# Patient Record
Sex: FEMALE | Race: WHITE | NOT HISPANIC OR LATINO | Employment: UNEMPLOYED | URBAN - METROPOLITAN AREA
[De-identification: names, ages, dates, MRNs, and addresses within clinical notes are randomized per-mention and may not be internally consistent; named-entity substitution may affect disease eponyms.]

---

## 2017-04-27 ENCOUNTER — OFFICE VISIT (OUTPATIENT)
Dept: PSYCHIATRY | Facility: CLINIC | Age: 52
End: 2017-04-27

## 2017-04-27 DIAGNOSIS — F43.23 ADJUSTMENT DISORDER WITH MIXED ANXIETY AND DEPRESSED MOOD: Primary | ICD-10-CM

## 2017-04-27 PROCEDURE — 90791 PSYCH DIAGNOSTIC EVALUATION: CPT | Performed by: PSYCHOLOGIST
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
## 2017-05-24 ENCOUNTER — OFFICE VISIT (OUTPATIENT)
Dept: PSYCHIATRY | Facility: CLINIC | Age: 52
End: 2017-05-24

## 2017-05-24 DIAGNOSIS — F41.9 ANXIETY DISORDER, UNSPECIFIED TYPE: Primary | ICD-10-CM

## 2017-05-24 DIAGNOSIS — R45.4 ANGER: ICD-10-CM

## 2017-05-24 DIAGNOSIS — F32.89 OTHER DEPRESSION: ICD-10-CM

## 2017-05-24 PROCEDURE — 90834 PSYTX W PT 45 MINUTES: CPT | Performed by: PSYCHOLOGIST

## 2017-06-01 ENCOUNTER — OFFICE VISIT (OUTPATIENT)
Dept: PSYCHIATRY | Facility: CLINIC | Age: 52
End: 2017-06-01

## 2017-06-01 DIAGNOSIS — F43.23 ADJUSTMENT DISORDER WITH MIXED ANXIETY AND DEPRESSED MOOD: Primary | ICD-10-CM

## 2017-06-01 DIAGNOSIS — R45.4 ANGER: ICD-10-CM

## 2017-06-01 PROCEDURE — 90834 PSYTX W PT 45 MINUTES: CPT | Performed by: PSYCHOLOGIST

## 2017-06-01 NOTE — PROGRESS NOTES
Psych Progress Note    Nunu Falk is 51 y.o. female     1. Description of Session    The pt talked about arguments that she has with her mother. The pt was tearful as she talked about her mother putting her down and criticizing her. Role playing was conducted in session in order to help the pt stand up to her mother in a firm/respectful way. Also, the therapist helped the pt note what behaviors the pt does to start or fuel an argument. The pt was able to admit how she can be easily irritated or defensive. The therapist is teaching the pt language to use in resolving conflict with others, for now, with her mother. Morton setting was discussed as well in terms of keeping to rigid of boundaries with others instead of making amends and listening to the other person's side or expressing when the pt feels hurt in order to start a dialogue about making amends.    2. Assessment/Diagnostic Impression    The pt struggles with anxiety (she was shaky in session and has had panic attacks) and depressed (due to having a low opinion of herself/lack of belief in herself). Distress tends to be manifested in anger and the pt has impaired coping skills to resolve conflicts with others.    3. Treatment Plan    Follow up with assigned homework regarding talking to her mother more effectively and resolving conflicts in a healthier way. Help her process and heal from past traumas and help her build confidence in order to work again in order to feel less distressed and angry.

## 2017-06-10 NOTE — PROGRESS NOTES
"    Psych Progress Note    Nunu Falk is 51 y.o. female     1. Description of Session    The pt talked about feeling hurt and angry lately. She admits to having anger management problems when she was assessed by this therapist. The therapist helped the pt to talk about with whom she argues the most and gets hurt by the most. She was able to discuss her strained relationship with her mother (who adopted her as a baby) and how the pt feels put down and/or never good enough for her mother. The intervention used was helping the pt 'identify her role' in the difficult relationship: staying defensive, lashing back, and/or shutting down. Role playing was conducted in order to help the pt communicate more effectively with her mother and learn when to walk away and when to stand up for herself (in a respectful way). The pt is to talk to her mother and request that her mother no longer put her down or criticize her with statements such as, \"that is stupid\" but to also express to her mother that she will do the same. She is to express that if she feels criticized that the conversation would be over (but it is the pt's responsibility to inform her mother first why the conversation is ending).    2. Assessment/Diagnostic Impression    The pt struggles with anxiety and depression (due to having a low opinion of herself/lack of belief in herself). Distress tends to be manifested as anger and lashing out at others.     3. Treatment Plan    Help her process and heal from past traumas and help her build confidence in order to communicate more effectively with others in order to feel less distressed.  "

## 2017-06-20 ENCOUNTER — OFFICE VISIT (OUTPATIENT)
Dept: PSYCHIATRY | Facility: CLINIC | Age: 52
End: 2017-06-20

## 2017-06-20 DIAGNOSIS — R45.4 ANGER: ICD-10-CM

## 2017-06-20 DIAGNOSIS — F43.23 ADJUSTMENT DISORDER WITH MIXED ANXIETY AND DEPRESSED MOOD: Primary | ICD-10-CM

## 2017-06-20 PROCEDURE — 90834 PSYTX W PT 45 MINUTES: CPT | Performed by: PSYCHOLOGIST

## 2017-06-26 ENCOUNTER — OFFICE VISIT (OUTPATIENT)
Dept: PSYCHIATRY | Facility: CLINIC | Age: 52
End: 2017-06-26

## 2017-06-26 DIAGNOSIS — R45.4 ANGER: Primary | ICD-10-CM

## 2017-06-26 DIAGNOSIS — F41.9 ANXIETY AND DEPRESSION: ICD-10-CM

## 2017-06-26 DIAGNOSIS — F32.A ANXIETY AND DEPRESSION: ICD-10-CM

## 2017-06-26 PROCEDURE — 90834 PSYTX W PT 45 MINUTES: CPT | Performed by: PSYCHOLOGIST

## 2017-07-25 ENCOUNTER — OFFICE VISIT (OUTPATIENT)
Dept: PSYCHIATRY | Facility: CLINIC | Age: 52
End: 2017-07-25

## 2017-07-25 DIAGNOSIS — R45.4 ANGER: ICD-10-CM

## 2017-07-25 DIAGNOSIS — F41.9 ANXIETY AND DEPRESSION: Primary | ICD-10-CM

## 2017-07-25 DIAGNOSIS — F32.A ANXIETY AND DEPRESSION: Primary | ICD-10-CM

## 2017-07-25 PROCEDURE — 90834 PSYTX W PT 45 MINUTES: CPT | Performed by: PSYCHOLOGIST

## 2017-08-07 NOTE — PROGRESS NOTES
Psych Progress Note    Nunu Falk is 51 y.o. female was seen for her scheduled appointment at [facility] from 10:00 - 10:45am      1. Description of Session    The pt talked about feeling upset that she does not seem to get along with her daughter lately. The pt was tearful in session when she talked about how she was afraid that if she continues to argue with her daughter, that she will lose her and the connection she has with her grand-baby. The therapist helped her to flush out fears and evaluate them for validity. It was noted that just because they argue does not automatically mean that the relationship is over. The pt also talked about feeling guilty that her daughter does not know that her real dad is someone other than she thinks it is - the pt wonders if she should tell her/the daughter or not. Pros and cons were weighed and the therapist asked the pt if the pt would like to know if she were in her daughter's position. The pt expressed that she would like to tell her daughter and this was rehearsed in session through role play in order to help the pt gain insight into how to tell her.     2. Assessment/Diagnostic Impression    The pt struggles with anxiety (she can get shaky in session and has had panic attacks) and depressed (due to having a low opinion of herself/lack of belief in herself). Distress tends to be manifested in anger and the pt has impaired coping skills to resolve conflicts with others, though she is learning new skills to better control her anger.    3. Treatment Plan    Follow up with assigned homework regarding talking to her daughter more effectively and resolving conflicts in a healthier way. Help her process and heal from past traumas and help her build confidence in order to work again in order to feel less distressed and angry.

## 2017-08-14 ENCOUNTER — OFFICE VISIT (OUTPATIENT)
Dept: PSYCHIATRY | Facility: CLINIC | Age: 52
End: 2017-08-14

## 2017-08-14 DIAGNOSIS — R45.4 ANGER: ICD-10-CM

## 2017-08-14 DIAGNOSIS — F41.9 ANXIETY AND DEPRESSION: Primary | ICD-10-CM

## 2017-08-14 DIAGNOSIS — F32.A ANXIETY AND DEPRESSION: Primary | ICD-10-CM

## 2017-08-14 PROCEDURE — 90834 PSYTX W PT 45 MINUTES: CPT | Performed by: PSYCHOLOGIST
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
## 2017-08-21 ENCOUNTER — OFFICE VISIT (OUTPATIENT)
Dept: PSYCHIATRY | Facility: CLINIC | Age: 52
End: 2017-08-21

## 2017-08-21 DIAGNOSIS — R45.4 ANGER: ICD-10-CM

## 2017-08-21 DIAGNOSIS — F32.A ANXIETY AND DEPRESSION: Primary | ICD-10-CM

## 2017-08-21 DIAGNOSIS — F41.9 ANXIETY AND DEPRESSION: Primary | ICD-10-CM

## 2017-08-21 PROCEDURE — 90834 PSYTX W PT 45 MINUTES: CPT | Performed by: PSYCHOLOGIST

## 2017-09-11 ENCOUNTER — OFFICE VISIT (OUTPATIENT)
Dept: PSYCHIATRY | Facility: CLINIC | Age: 52
End: 2017-09-11

## 2017-09-11 DIAGNOSIS — F32.A ANXIETY AND DEPRESSION: Primary | ICD-10-CM

## 2017-09-11 DIAGNOSIS — F41.9 ANXIETY AND DEPRESSION: Primary | ICD-10-CM

## 2017-09-11 DIAGNOSIS — R45.4 ANGER: ICD-10-CM

## 2017-09-11 PROCEDURE — 90834 PSYTX W PT 45 MINUTES: CPT | Performed by: PSYCHOLOGIST

## 2017-09-11 NOTE — PROGRESS NOTES
Psych Progress Note    Nunu Falk is 51 y.o. female was seen for her scheduled appointment at [facility] from 10:00 - 10:45am.    1. Description of Session    The pt talked about arguing with her boyfriend, a neighbor, and her daughter. She expressed that she is easily irritated, holds things in, and then blows up. Issues of anger management were addressed, starting with processing the real emotions which are fear/worry and feeling hurt. Why she was hurt or fearful were discussed related to each argument. She then could see how she, in a way, picked a fight with her boyfriend when she was expressing in an unhealthy way that she does not trust him. This turned into a discussion about how the two of them need to be more open about money. Regarding another argument, after processing, the pt was able to realize that she fears that her daughter will cut her off and not talk to her. Role playing was conducted in order to help the pt make 'I statements,' and less 'you statements.' She is to practice expressing herself more often in terms of saying, 'I feel hurt that you ____' or 'I'm worried that ____' and then listen to the other person's side of the story before jumping to conclusions.     2. Assessment/Diagnostic Impression    The pt presents with anger, anxiety, and depression. She is learning better ways to manage her mood including improving her communication with others in order to feel less distressed.     3. Treatment Plan    Continue with anger management training and mood regulation training in order to diminish the pts distress.

## 2017-09-18 ENCOUNTER — OFFICE VISIT (OUTPATIENT)
Dept: PSYCHIATRY | Facility: CLINIC | Age: 52
End: 2017-09-18

## 2017-09-18 DIAGNOSIS — F32.A ANXIETY AND DEPRESSION: Primary | ICD-10-CM

## 2017-09-18 DIAGNOSIS — F41.9 ANXIETY AND DEPRESSION: Primary | ICD-10-CM

## 2017-09-18 DIAGNOSIS — R45.4 ANGER: ICD-10-CM

## 2017-09-18 PROCEDURE — 90834 PSYTX W PT 45 MINUTES: CPT | Performed by: PSYCHOLOGIST
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
## 2017-09-25 ENCOUNTER — OFFICE VISIT (OUTPATIENT)
Dept: PSYCHIATRY | Facility: CLINIC | Age: 52
End: 2017-09-25

## 2017-09-25 DIAGNOSIS — F32.A ANXIETY AND DEPRESSION: Primary | ICD-10-CM

## 2017-09-25 DIAGNOSIS — F41.9 ANXIETY AND DEPRESSION: Primary | ICD-10-CM

## 2017-09-25 DIAGNOSIS — R45.4 ANGER: ICD-10-CM

## 2017-09-25 PROCEDURE — 90834 PSYTX W PT 45 MINUTES: CPT | Performed by: PSYCHOLOGIST

## 2017-09-25 NOTE — PROGRESS NOTES
Psych Progress Note    Nunu Falk is 51 y.o. female was seen for her scheduled appointment at [facility] from 10:00 - 10:45am.    1. Description of Session    The pt talked about arguing with her ex-. They live together and have for some time now and he supports the two of them financially. She talked about feeling hurt that he may (or may not) be cheating on her. He expressed frustration with her for getting short with him at a restaurant a while back. The therapist helped the pt see how they both care about each other but they just do not know where they  the relationship (are they together or not?). The pt is to have this conversation with him in a positive and solution-focused way ('we obviously care about each other and love each other. Why not consider us together and exclusive?). Similar to previous sessions, the therapist helped the pt to see the pt's role in the arguments and helped the pt identify how she may need to apologize for getting short with him at the restaurant. Another part of the session was used to help the pt express understanding of where both her mother or ex are coming from (feeling hurt, having a difficult life, needing to be appreciated) in order to help the pt feel less angry/not take things so personally.    2. Assessment/Diagnostic Impression    The pt presents with anger, anxiety, and depression. She is learning better ways to manage her mood including improving her communication with others in order to feel less distressed.     3. Treatment Plan    Continue with anger management training and mood regulation training in order to diminish the pts distress.
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
## 2017-10-02 ENCOUNTER — OFFICE VISIT (OUTPATIENT)
Dept: PSYCHIATRY | Facility: CLINIC | Age: 52
End: 2017-10-02

## 2017-10-02 DIAGNOSIS — F32.A ANXIETY AND DEPRESSION: Primary | ICD-10-CM

## 2017-10-02 DIAGNOSIS — F41.9 ANXIETY AND DEPRESSION: Primary | ICD-10-CM

## 2017-10-02 DIAGNOSIS — R45.4 ANGER: ICD-10-CM

## 2017-10-02 PROCEDURE — 90834 PSYTX W PT 45 MINUTES: CPT | Performed by: PSYCHOLOGIST

## 2017-10-02 NOTE — PROGRESS NOTES
Psych Progress Note    Nunu Falk is 51 y.o. female was seen for her scheduled appointment at [facility] from 9:00 - 9:45am.    1. Description of Session    The pt talked about feeling angry that she could not rent a house she and her ex- (S) wanted to rent because they have dogs. The pt expressed that she got her dogs registered as therapy dogs and said how this is not fair. Similar to previous sessions, the therapist helped her to work on her anger. The therapist helped her understand how landlords, such as private landlords can decided to allow pets or not; it is their right. The pt was able to make sense of this after a while. Seeing from the other person's perspective was an intervention used today in order to help her calm and be less angry. For example, the therapist helped her to see her pattern where the pt lashes out, then the other person gets defensive/lashes out, and that is how a lot of her anger is fueled (and no one wins). The pt is to practice seeing issues from other people's perspective.    2. Assessment/Diagnostic Impression    The pt presents with anger, anxiety, and depression. She is learning better ways to manage her mood including improving her communication with others in order to feel less distressed.     3. Treatment Plan    Continue with anger management training and mood regulation training in order to diminish the pts distress.

## 2017-10-23 ENCOUNTER — OFFICE VISIT (OUTPATIENT)
Dept: PSYCHIATRY | Facility: CLINIC | Age: 52
End: 2017-10-23

## 2017-10-23 DIAGNOSIS — F32.A ANXIETY AND DEPRESSION: Primary | ICD-10-CM

## 2017-10-23 DIAGNOSIS — R45.4 ANGER: ICD-10-CM

## 2017-10-23 DIAGNOSIS — F41.9 ANXIETY AND DEPRESSION: Primary | ICD-10-CM

## 2017-10-23 PROCEDURE — 90834 PSYTX W PT 45 MINUTES: CPT | Performed by: PSYCHOLOGIST

## 2017-10-23 NOTE — PROGRESS NOTES
PROGRESS NOTE  Data:  Nunu Falk is a 51 y.o. female who met with the undersigned for a regularly scheduled individual outpatient psychotherapy session from 10:00 - 10:45am.      Clinical Maneuvering/Intervention:      Pt talked about struggling with anger and irritability. She argued with a counselor assessing her in order to get disability. She argued with her ex- and a nurse at an appointment as well. She also is worried about her son's safety at school but is hesitant to talk to him about it. Perspective taking was conducted, helping the pt practice seeing things from the other person's perspective. The therapist helped the pt practice this over and over with each person she mentioned - that the nurse is just doing her job, that the counselor is just doing her job, etc. The pt was able to do this exercise and seemed to relax physically. She talked about a neighbor who seems negative and miserable. The therapist helped her see that if she continues to have outbursts without seeing situations from others' perspective, that she could end up being like this neighbor. Instead the pt is to continue taking care of herself, her own responsibilities, and to make smart decisions to not engage in negativity.     Assessment                       The pt presents with anger, anxiety, and depression. She seems to have mood swings due to impaired coping skills for stress. She is learning better ways to manage her mood including improving her                  communication with others in order to feel less distressed.       Mental Status Exam  Hygiene:  good  Dress: normal  Attitude:  Cooperative and proactive  Motor Activity: normal  Speech: normal  Mood:  anxious, but calmer towards end of session  Affect:  congruent  Thought Processes: normal  Thought Content: slightly negative  Suicidal Thoughts:  not endorsed  Homicidal Thoughts:  not endorsed  Crisis Safety Plan: not needed at this time  Hallucinations:   none      Patient's Support Network Includes:  family, friends      Progress toward goal: there is evidence to suggest that she is getting better at controlling anger as she is picking up on skills learned in session and expresses feeling proud of herself for handling difficulties without so much anger.      Functional Status: moderate       Prognosis: anxiety, depression, anger      Plan      Pt will adhere to medication regimen as prescribed. Pt will continue taking care of herself, her own responsibilities, and to make smart decisions to not engage in negativity (arriving places early to decrease emotional tension, step back from negativity and think about her reaction first, etc).    Renetta Allen, PhD, LP

## 2017-11-15 ENCOUNTER — OFFICE VISIT (OUTPATIENT)
Dept: PSYCHIATRY | Facility: CLINIC | Age: 52
End: 2017-11-15

## 2017-11-15 DIAGNOSIS — R45.4 ANGER: ICD-10-CM

## 2017-11-15 DIAGNOSIS — F32.A ANXIETY AND DEPRESSION: Primary | ICD-10-CM

## 2017-11-15 DIAGNOSIS — F41.9 ANXIETY AND DEPRESSION: Primary | ICD-10-CM

## 2017-11-15 PROCEDURE — 90834 PSYTX W PT 45 MINUTES: CPT | Performed by: PSYCHOLOGIST

## 2017-11-15 NOTE — PROGRESS NOTES
PROGRESS NOTE  Data:    Nunu Falk is a 51 y.o. female who met with the undersigned for a regularly scheduled individual outpatient psychotherapy session from 10:00 - 10:45am.      Clinical Maneuvering/Intervention:      Pt talked about struggling with anger and irritability, many times towards her ex- (S) with whom she lives. The therapist helped the pt practice explaining where S was coming from when he seemed angry - a skill often practiced in session in order to promote understanding and decrease her anger. The therapist also provided education about how not knowing what type of relationship they have leads to tension in the relationship (if they live together and do not say they are exclusive, this could invite problems of jealousy, etc). She talked about a neighbor who seems negative and miserable. The therapist helped her see that if she continues to have outbursts without seeing situations from others' perspective, that she could end up being or acting like those who give her a hard time. Instead the pt is to continue taking care of herself, her own responsibilities, and to make smart decisions to not engage in negativity.     Assessment                       The pt presents with anger, anxiety, and depression (though depression has decreased). She seems to have mood swings due to impaired coping skills for stress. She is learning better ways to                    manage her mood by communicating more effectively, engaging less in negativity, and taking time out when getting angry in order to calm herself.      Mental Status Exam  Hygiene:  good  Dress: normal  Attitude:  Cooperative and proactive (she presents as open to learn and improve her life)  Motor Activity: normal  Speech: normal  Mood:  irritated   Affect:  congruent  Thought Processes: normal  Thought Content: slightly negative  Suicidal Thoughts:  not endorsed  Homicidal Thoughts:  not endorsed  Crisis Safety Plan: not needed at this  time  Hallucinations:  none      Patient's Support Network Includes:  family, friends      Progress toward goal: there is evidence to suggest that she is getting better at controlling anger as she is picking up on skills learned in session and expresses feeling proud of herself for handling difficulties without as much anger.      Functional Status: moderate       Prognosis: anxiety, depression, anger      Plan      Pt will adhere to medication regimen as prescribed. Pt will continue taking care of herself, her own responsibilities, and to make smart decisions to not engage in negativity. She will practice new skills and skills effective in the past in order to better manage anxiety and anger: arriving places early to decrease emotional tension, step back from negativity and think about her reaction first, and communicate her feelings on going so emotions do not built up and lead to anger outbursts.    Renetta Allen, PhD, LP

## 2017-11-29 ENCOUNTER — OFFICE VISIT (OUTPATIENT)
Dept: PSYCHIATRY | Facility: CLINIC | Age: 52
End: 2017-11-29

## 2017-11-29 DIAGNOSIS — F32.A ANXIETY AND DEPRESSION: Primary | ICD-10-CM

## 2017-11-29 DIAGNOSIS — F41.9 ANXIETY AND DEPRESSION: Primary | ICD-10-CM

## 2017-11-29 DIAGNOSIS — R45.4 ANGER: ICD-10-CM

## 2017-11-29 PROCEDURE — 90834 PSYTX W PT 45 MINUTES: CPT | Performed by: PSYCHOLOGIST

## 2017-12-06 ENCOUNTER — OFFICE VISIT (OUTPATIENT)
Dept: PSYCHIATRY | Facility: CLINIC | Age: 52
End: 2017-12-06

## 2017-12-06 DIAGNOSIS — F32.A ANXIETY AND DEPRESSION: Primary | ICD-10-CM

## 2017-12-06 DIAGNOSIS — F41.9 ANXIETY AND DEPRESSION: Primary | ICD-10-CM

## 2017-12-06 DIAGNOSIS — R45.4 ANGER: ICD-10-CM

## 2017-12-06 PROCEDURE — 90834 PSYTX W PT 45 MINUTES: CPT | Performed by: PSYCHOLOGIST

## 2017-12-11 NOTE — PROGRESS NOTES
PROGRESS NOTE    Data:    Nunu Falk is a 51 y.o. female who met with the undersigned for a regularly scheduled individual outpatient psychotherapy session from 10:00 - 10:45am.      Clinical Maneuvering/Intervention:      The pt was tearful when she talked about S and how she does not understand what to do with him. Education about lack of commitment and still living together and/or being intimate with one another and how this invites problems was provided again. The therapist pushed the pt to talk about what she really wants in that relationship to which the pt expressed that she did not know. Another issue of the dependency she has on him for money was addressed again including what she should do now should the two of them break up/separate ways (i.e., how she will support herself financially). She doubted her ability to earn her own income and this was challenged. The imagination technique was used to help her see herself making money and living more independently in order to provide her motivation.     Assessment                       The pt presents with anger, anxiety, and depression (though depression has decreased). She seems to have mood swings due to impaired coping skills for stress.                     She is learning better ways to manage her mood by communicating more effectively, engaging less in negativity, and taking time out when getting angry in order to calm herself.      Mental Status Exam  Hygiene:  good  Dress: normal  Attitude:  Cooperative and proactive (she presents as open to learn and improve her life)  Motor Activity: normal  Speech: normal  Mood:  irritated   Affect:  congruent  Thought Processes: normal  Thought Content: slightly negative  Suicidal Thoughts:  not endorsed  Homicidal Thoughts:  not endorsed  Crisis Safety Plan: not needed at this time  Hallucinations:  none      Patient's Support Network Includes:  family, friends      Progress toward goal: there is evidence to  suggest that she is getting better at controlling anger as she is picking up on skills learned in session and expresses feeling proud of herself for handling difficulties without as much anger. She struggles to have a healthy relationship with S and without them sorting out their differences, the pt is likely to continue to suffer.      Functional Status: moderate       Prognosis: anxiety, depression, anger      Plan      Pt is to talk to friend at [facility] about working there, starting out part-time if needed. Pt will talk with S about the status of their relationship including what they expect out of each other, what each other needs, and what to do if they decide to no longer be together.

## 2017-12-11 NOTE — PROGRESS NOTES
PROGRESS NOTE    Data:    Nunu Falk is a 51 y.o. female who met with the undersigned for a regularly scheduled individual outpatient psychotherapy session from 10:00 - 10:45am.      Clinical Maneuvering/Intervention:      Pt talked about feelings of anger she has towards her ex- (S) with whom she lives. She suspects that he is cheating on her and despite arguing with him, he denies it or just does not really respond which makes her more upset. A grounding intervention was used, in that the therapist helped the pt step back from the situation and see how they have a relationship of sorts, but neither one of them really knows what it is. Education about lack of commitment and still living together and/or being intimate with one another and how this invites problems was provided. The therapist pushed the pt to talk about what she really wants in that relationship to which the pt expressed that she did not know. Another issue of the dependency she has on him for money was addressed including what she should do now should the two of them break up/separate ways (i.e., how she will support herself financially).     Assessment                       The pt presents with anger, anxiety, and depression (though depression has decreased). She seems to have mood swings due to impaired coping skills for stress.                     She is learning better ways to manage her mood by communicating more effectively, engaging less in negativity, and taking time out when getting angry in order to calm herself.      Mental Status Exam  Hygiene:  good  Dress: normal  Attitude:  Cooperative and proactive (she presents as open to learn and improve her life)  Motor Activity: normal  Speech: normal  Mood:  irritated   Affect:  congruent  Thought Processes: normal  Thought Content: slightly negative  Suicidal Thoughts:  not endorsed  Homicidal Thoughts:  not endorsed  Crisis Safety Plan: not needed at this time  Hallucinations:   none      Patient's Support Network Includes:  family, friends      Progress toward goal: there is evidence to suggest that she is getting better at controlling anger as she is picking up on skills learned in session and expresses feeling proud of herself for handling difficulties without as much anger. She struggles to have a healthy relationship with S and without them sorting out their differences, the pt is likely to continue to suffer.      Functional Status: moderate       Prognosis: anxiety, depression, anger      Plan      Pt will adhere to medication regimen as prescribed. Pt will talk with S about the status of their relationship including what they expect out of each other, what each other needs, and what to do if they decide to no longer be together.

## 2018-01-22 ENCOUNTER — OFFICE VISIT (OUTPATIENT)
Dept: PSYCHIATRY | Facility: CLINIC | Age: 53
End: 2018-01-22

## 2018-01-22 DIAGNOSIS — F32.9 REACTIVE DEPRESSION: Primary | ICD-10-CM

## 2018-01-22 DIAGNOSIS — R45.4 ANGER: ICD-10-CM

## 2018-01-22 PROCEDURE — 90834 PSYTX W PT 45 MINUTES: CPT | Performed by: PSYCHOLOGIST

## 2018-01-22 NOTE — PROGRESS NOTES
"PROGRESS NOTE    Data:  Nunu Falk is a 52 y.o. female who met with the undersigned for a scheduled individualoutpatient psychotherapy session from 10:00 - 10:45am.      Clinical Maneuvering/Intervention:      Pt talked about struggling with several issues as of late. She talked about finding out that her mother has breast cancer, her ex- S (with whom she lives) got fired, and the pt is stressed about her relationships with S and her daughter. Associated feelings were processed and validated. The pt was assisted in expressing herself, discussing recent events, and noting when and how the pt did a better job at controlling her anger. The pt admitted to getting quite angry several times in the past few weeks and having \"melt downs,\" but not acting out in anger like she used to (she was not violent). She did talk about S getting another job, but the pt still worries that if she wants to move out and away from him, that she has no where to go (given she has 5 dogs). The idea of the pt making her own money was mentioned again at the end of the session but not discussed in detail due to the other issues needing to be processed today.     Mental Status Exam  Hygiene:  good  Dress: appropriate  Attitude:  Cooperative   Motor Activity: normal  Speech: normal  Mood: irritable, depressed  Affect:  congruent  Thought Processes: normal  Thought Content:  normal  Suicidal Thoughts:  not endorsed  Homicidal Thoughts:  not endorsed  Crisis Safety Plan: not needed   Hallucinations:  none      Patient's Support Network Includes:  family      Progress toward goal: there is evidence to suggest that she is improving in handling her anger as she has had less angry outbursts/physical violence than in the past despite enduring recent stressors.       Functional Status: moderate      Prognosis: good with practice of anger management and making healthier/better life choices    Assessment      The pt presented as depressed and " irritable. She does suffer from anxiety problems but depression has increased and seems to be replacing feelings of anxiety as of late.       Plan      In order to diminish symptoms of depression, the pt will be active in being there for her mother and go with her mother to the doctor (for her mother's biopsy) on Thursday. She will spend time with her mother and her daughter in order to keep communication open and maintain good relationships with them (in the next 2 weeks) so that the pt can feel supported and less depressed.     Renetta Allen, PhD, LP

## 2018-02-20 ENCOUNTER — OFFICE VISIT (OUTPATIENT)
Dept: PSYCHIATRY | Facility: CLINIC | Age: 53
End: 2018-02-20

## 2018-02-20 DIAGNOSIS — R45.4 ANGER: Primary | ICD-10-CM

## 2018-02-20 DIAGNOSIS — F33.1 MODERATE EPISODE OF RECURRENT MAJOR DEPRESSIVE DISORDER (HCC): ICD-10-CM

## 2018-02-20 PROCEDURE — 90834 PSYTX W PT 45 MINUTES: CPT | Performed by: PSYCHOLOGIST

## 2018-02-20 NOTE — PROGRESS NOTES
"PROGRESS NOTE    Data:  Nunu Falk is a 52 y.o. female who met with the undersigned for a scheduled individualoutpatient psychotherapy session from 11:00 - 11:45am.      Clinical Maneuvering/Intervention:      Pt talked about struggling with several issues as of late and was tearful when talking about how her mother has been diagnosed with breast cancer. Feelings were processed and validated. How her mother having a health scare leads to the pt caring more about her and treating her nicer were discussed. She talked about feeling angry towards S, and angry in general much of the time. Anger management was a focus of the session. Hurt feelings in different respects were processed in order to help the pt process this, let it go, and then feel less angry. She was assisted in making connections between feeling trapped/stuck in life and feelings of anger. She was assisted in understanding how she tends to keep herself stuck in life (e.g., being too dependent on others for money like S). Fears of working again were processed thoroughly. She was assisted in recognizing how she could be a great asset to a place of work and how problems she had in the past with others does not mean she will have these problems again. Progress in her ability to manage her anger was highlighted in order to encourage her to continue acting more like a \"grown woman\" instead of a misbehaving child. She expressed gratitude for today's session.    Mental Status Exam  Hygiene:  good  Dress: appropriate  Attitude:  abrasive mixed with hurt  Motor Activity: normal  Speech: normal  Mood: irritable, depressed  Affect:  congruent  Thought Processes: normal  Thought Content:  normal  Suicidal Thoughts:  not endorsed  Homicidal Thoughts:  not endorsed  Crisis Safety Plan: not needed   Hallucinations:  none      Patient's Support Network Includes:  family      Progress toward goal: there is evidence to suggest that she is improving in handling her anger as " she has had less angry outbursts/physical violence than in the past despite enduring recent stressors.       Functional Status: moderate      Prognosis: good with practice of anger management and making healthier/better life choices    Assessment      The pt presented as depressed and irritable. She does suffer from anxiety problems but depression has increased and seems to be replacing feelings of anxiety as of late. She struggles to believe in herself that she can work and make her own money, but she is improving in this area as well, albeit slowly.      Plan      In order to diminish symptoms of depression and irritability, the pt will do some research on a home she would like for herself in the future, identify the estimated cost (e.g., of a trailer possibly), and then work backwards to figure out how much she needs to make in order to obtain a home like that. She is to make efforts to clarify a vision for herself and how to get there in order to motivate her to want to work again and promote her sense of self-worth (thus decrease depression and irritability).     Renetta Allen, PhD, LP

## 2018-03-29 ENCOUNTER — OFFICE VISIT (OUTPATIENT)
Dept: PSYCHIATRY | Facility: CLINIC | Age: 53
End: 2018-03-29

## 2018-03-29 DIAGNOSIS — F33.41 RECURRENT MAJOR DEPRESSIVE DISORDER, IN PARTIAL REMISSION (HCC): Primary | ICD-10-CM

## 2018-03-29 PROCEDURE — 90834 PSYTX W PT 45 MINUTES: CPT | Performed by: PSYCHOLOGIST

## 2018-04-02 NOTE — PROGRESS NOTES
"PROGRESS NOTE    Data:  Nunu Falk is a 52 y.o. female who met with the undersigned for a scheduled individualoutpatient psychotherapy session from 1:00 - 1:45pm.      Clinical Maneuvering/Intervention:      Pt talked about finally facing her fears and getting a job at [employer] working as a . She smiled more in session today than she ever has and joked more. She talked more about the good things in her life in session today than she has in previous sessions. Her progress was highlighted and processed in session, in part to celebrate her accomplishments, but in part to help her gain insight into her strengths and abilities she can use to continue improving her life. She talked about feeling better, freer, more empowered, and surprised that she was able to learn her job as easily as she did. She made a comment that she worries about having an \"outburst\" at work, but she was corrected and informed that she was afraid that she could not get a job or do a job, but she is doing that, so why not believe in herself a bit more? She was assisted in understanding how she has made progress in managing her anger, therefore, why not give herself more credit that she can in fact manage her anger and continue to improve in this area? The pt expressed gratitude for today's session.    Mental Status Exam  Hygiene:  good  Dress: appropriate  Attitude:  positive  Motor Activity: normal  Speech: normal  Mood: positive  Affect:  congruent  Thought Processes: normal  Thought Content:  normal  Suicidal Thoughts:  not endorsed  Homicidal Thoughts:  not endorsed  Crisis Safety Plan: not needed   Hallucinations:  none      Patient's Support Network Includes:  family, friends      Progress toward goal: there is evidence to suggest that she has improved in managing her anger and faced her fear of getting a job by getting one and now her mood has greatly improved      Functional Status: moderate      Prognosis: good     Assessment "      The pt presented as proud of herself and happy to have gotten a job. She seems to be enjoying the endeavor in many different ways and she seems more empowered. Some maladaptive thoughts are evident and need attention and she may need assistance with anger management later on, but progress is also evident as of today.      Plan      In order to prevent relapse of symptoms of depression and anger problems, the pt will make a point to maintain healthy relationship with co-workers and keep communication open between herself and her boss (ongoing). She will continue to process issues/frustrations as they come up with supportive people and in a calm/respectful way in order to avoid the build up of emotional tension (ongoing).     Renetta Allen, PhD, LP

## 2018-05-09 ENCOUNTER — OFFICE VISIT (OUTPATIENT)
Dept: PSYCHIATRY | Facility: CLINIC | Age: 53
End: 2018-05-09

## 2018-05-09 DIAGNOSIS — F43.9 FEELING STRESSED OUT: Primary | ICD-10-CM

## 2018-05-09 PROCEDURE — 90837 PSYTX W PT 60 MINUTES: CPT | Performed by: PSYCHOLOGIST

## 2018-05-09 NOTE — PROGRESS NOTES
PROGRESS NOTE    Data:  Nunu Falk is a 52 y.o. female who met with the undersigned for a scheduled individualoutpatient psychotherapy session from 1:00 - 1:56pm.      Clinical Maneuvering/Intervention:      Pt talked about how she is really liking her new job, but feeling stressed and frustrated with her daughter as they have been fighting. She also talked about how she still does not know what sort of relationship she has with her ex- (with whom she lives). The pt talked about spending time with other men, one in particular, but the pt was reminded that she gets irritated if not very upset when her ex engages in such behavior. The pt was assisted in seeing how she has made a lot of progress in improving the quality of her life, that she may be doing some things lately to sabotage her progress and that this is unneccessary (i.e., the pt deserves a good life). The pt expressed that this is probably true and will give it more thought. It was recommended that she and her ex at least talk about their expectations of each other in their relationship as it stands now. Then issues about how she communicates with her daughter were addressed. Feelings were processed and validated. Role playing was conducted in order to help the pt gain insight into how to improve her communication skills, decrease tension with the other person, and notice things the pt may be doing (and needs to stop doing) in order to improve the quality of the relationship. The pt expressed gratitude for today's session.     Mental Status Exam  Hygiene:  good  Dress: appropriate  Attitude:  open to learning  Motor Activity: normal  Speech: normal  Mood: stressed and irritated  Affect:  congruent  Thought Processes: normal  Thought Content:  normal  Suicidal Thoughts:  not endorsed  Homicidal Thoughts:  not endorsed  Crisis Safety Plan: not needed   Hallucinations:  none      Patient's Support Network Includes:  family, friends      Progress  toward goal: there is evidence to suggest that she has improved in managing her anger, is feeling stronger/more empowered by now making money, but struggles at times to get along with those close to her (daughter, boyfriend)      Functional Status: moderate      Prognosis: good     Assessment      The pt presented as stressed and irritated due to struggles to communicate with her daughter and her ex. She seems to benefit from role play exercises as it helps build insight into her problems and learn new ways of expressing herself.       Plan      In order to prevent relapse of anger and diminish symptoms of stress, the pt will avoiding flirting with other men (until her status with her ex is determined) and practice communicating with her daughter more effectively as was practiced today (ongoing).     Renetta Allen, PhD, LP

## 2018-06-06 ENCOUNTER — OFFICE VISIT (OUTPATIENT)
Dept: PSYCHIATRY | Facility: CLINIC | Age: 53
End: 2018-06-06

## 2018-06-06 DIAGNOSIS — F43.9 FEELING STRESSED OUT: Primary | ICD-10-CM

## 2018-06-06 DIAGNOSIS — R45.4 ANGER: ICD-10-CM

## 2018-06-06 PROCEDURE — 90834 PSYTX W PT 45 MINUTES: CPT | Performed by: PSYCHOLOGIST

## 2018-06-07 NOTE — PROGRESS NOTES
"PROGRESS NOTE    Data:  Nunu Falk is a 52 y.o. female who met with the undersigned for a scheduled individualoutpatient psychotherapy session from 1:00 - 1:45pm.      Clinical Maneuvering/Intervention:      Pt talked about how she has been struggling with anxiety, not getting along with her daughter, and not getting along with a couple of people at work. Anger and mood management were the focus of today's session. She was educated about the concept of when wherever she goes, others annoy her, that she needs to look at the common denominator (i.e., herself). She was assisted in processing her frustrations, whereby feelings were processed/validated, but she was also assisted in understanding her own role in her problems. When she is overstepping, trying to control something she cannot control, or taking things too personally, were discussed in session. She was then able to talk about how she does not need to make other people's business her business. She can improve on letting her daughter make decisions for herself or letting co-workers be themselves and not make other people's problems her problems. She was taught the technique of (when starting to feel annoyed), she can ask herself, \"what is my role in this?\" If she does not have a role, she will step back and not engage. She came up with the idea of asking herself, \"what would Aayush do?\" and she was assigned the task of asking herself this several times a week or as much as she needs in order to improve in mood management.     Mental Status Exam  Hygiene:  good  Dress: appropriate  Attitude:  open to learning  Motor Activity: normal  Speech: normal  Mood: stressed and irritated  Affect:  congruent  Thought Processes: normal  Thought Content:  normal  Suicidal Thoughts:  not endorsed  Homicidal Thoughts:  not endorsed  Crisis Safety Plan: not needed   Hallucinations:  none      Patient's Support Network Includes:  family, friends      Progress toward goal: there " "is evidence to suggest that she is learning new ways to improve how to relate to others and in turn, to better manage her anger      Functional Status: moderate      Prognosis: good     Assessment      The pt presented as stressed and irritated due to struggles in relating to others. She still struggles to manage her anger, but is learning better ways to cope with it.      Plan      In order to prevent symptoms of anger and diminish symptoms of stress, the pt is to practice the saying she came up with today (\"What would Aayush do?\") when starting to feel stressed/irriable and then act on the answer to this question (ongoing).     Renetta Allen, PhD, LP     "

## 2018-07-12 ENCOUNTER — OFFICE VISIT (OUTPATIENT)
Dept: PSYCHIATRY | Facility: CLINIC | Age: 53
End: 2018-07-12

## 2018-07-12 DIAGNOSIS — R45.4 ANGER: ICD-10-CM

## 2018-07-12 DIAGNOSIS — F43.9 FEELING STRESSED OUT: Primary | ICD-10-CM

## 2018-07-12 PROCEDURE — 90834 PSYTX W PT 45 MINUTES: CPT | Performed by: PSYCHOLOGIST
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
## 2018-08-01 ENCOUNTER — OFFICE VISIT (OUTPATIENT)
Dept: PSYCHIATRY | Facility: CLINIC | Age: 53
End: 2018-08-01

## 2018-08-01 DIAGNOSIS — F43.9 FEELING STRESSED OUT: Primary | ICD-10-CM

## 2018-08-01 PROCEDURE — 90834 PSYTX W PT 45 MINUTES: CPT | Performed by: PSYCHOLOGIST

## 2018-08-01 NOTE — PROGRESS NOTES
PROGRESS NOTE    Data:  Nunu Falk is a 52 y.o. female who met with the undersigned for a scheduled individualoutpatient psychotherapy session from 2:00 - 2:40pm.      Clinical Maneuvering/Intervention:      Pt talked about how she is afraid to go back to work. She fears having another 'break down.' Break downs were processed and she was assisted in understanding what caused them in the first place. Maladaptive thought patterns were identified, challenged, and evaluated for validity in order to allow for the pt to chose different and more adaptive ways of thinking. Feelings were processed and validated. She was pushed to solve her own issues and was able to say how she can go back to work, she will just work more closely with her manager and get her needs met (working with a another person all the time and not being left alone). She was able to laugh and smile more towards the end of session. She talked about how the job actually means a lot to her and she does not need to over think it. She was tearful some in session because she felt sorry for herself, but she also then was able to talk herself down and calm herself. The pt expressed gratitude for today's session.    Mental Status Exam  Hygiene:  good  Dress: appropriate  Attitude:  open to learning  Motor Activity: normal  Speech: normal  Mood: stressed  Affect:  congruent  Thought Processes: normal  Thought Content:  normal  Suicidal Thoughts:  not endorsed  Homicidal Thoughts:  not endorsed  Crisis Safety Plan: not needed   Hallucinations:  none      Patient's Support Network Includes:  family, friends      Progress toward goal: there is evidence to suggest that she is learning new ways to improve how to relate to others and in turn, to better manage her anger      Functional Status: moderate      Prognosis: good     Assessment      The pt presented as stressed and irritated due to struggles in relating to others. She still struggles to manage her anger, but  is learning better ways to cope with it.      Plan      In order to prevent symptoms of anger and diminish symptoms of stress, the pt is to face her fear of going back to work remembering the things she learned about herself today and believing in herself that she can do it (this week).    Renetta Allen, PhD, LP

## 2018-10-05 ENCOUNTER — OFFICE VISIT (OUTPATIENT)
Dept: PSYCHIATRY | Facility: CLINIC | Age: 53
End: 2018-10-05

## 2018-10-05 DIAGNOSIS — F32.A ANXIETY AND DEPRESSION: Primary | ICD-10-CM

## 2018-10-05 DIAGNOSIS — F41.9 ANXIETY AND DEPRESSION: Primary | ICD-10-CM

## 2018-10-05 PROCEDURE — 90834 PSYTX W PT 45 MINUTES: CPT | Performed by: PSYCHOLOGIST

## 2018-10-08 NOTE — PROGRESS NOTES
"PROGRESS NOTE    Data:  Nunu Falk is a 52 y.o. female who met with the undersigned for a scheduled individualoutpatient psychotherapy session from 1:00 - 1:45pm.      Clinical Maneuvering/Intervention:      Pt talked about how she was afraid to go back to work, but did it anyway. She was commended for this and insight building was conducted in order to help the pt understand how she was able to overcome this fear and in turn, what helps her to improve her confidence/mood/functioning in life. She admits to having a meltdown at work and getting very upset by a co-worker on a previous shift who left quite a bit of dishes for her to do. Feelings were processed and validated. What she learned and ways in which she handled it like a \"grown woman\" instead of a \"child\" were discussed. She was then able to see how she felt grateful for a manager who listens, proud of herself for going back to work, and proud of herself for not going off/being violent in any sort of way (this was an issue for her in the past). Anger management was continued by helping her note how she is expressing herself more effectively - instead of yelling or throwing things, she is using her \"grown\" or \"adult\" voice more often. The pt expressed feeling proud of her progress. The pt expressed gratitude for today's session.    Mental Status Exam  Hygiene:  good  Dress: appropriate  Attitude:  open to learning  Motor Activity: normal  Speech: normal  Mood: stressed  Affect:  congruent  Thought Processes: normal  Thought Content:  normal  Suicidal Thoughts:  not endorsed  Homicidal Thoughts:  not endorsed  Crisis Safety Plan: not needed   Hallucinations:  none      Patient's Support Network Includes:  family, friends      Progress toward goal: there is evidence to suggest that she is learning new ways to improve how to relate to others and in turn, to better manage her anger      Functional Status: moderate      Prognosis: good     Assessment      The pt " "presented as anxious and depressed, due mainly to struggles in relating to others. She still struggles to manage her anger, but is learning better ways to cope with it.      Plan      In order to prevent symptoms of anger and diminish symptoms of stress, the pt is to continue to use her \"adult\" or \"grown woman\" voice in order to communicate with others (ongoing).    Renetta Allen, PhD, LP     "

## 2018-10-19 ENCOUNTER — OFFICE VISIT (OUTPATIENT)
Dept: PSYCHIATRY | Facility: CLINIC | Age: 53
End: 2018-10-19

## 2018-10-19 DIAGNOSIS — F41.9 ANXIETY AND DEPRESSION: Primary | ICD-10-CM

## 2018-10-19 DIAGNOSIS — F32.A ANXIETY AND DEPRESSION: Primary | ICD-10-CM

## 2018-10-19 PROCEDURE — 90834 PSYTX W PT 45 MINUTES: CPT | Performed by: PSYCHOLOGIST

## 2018-10-21 NOTE — PROGRESS NOTES
PROGRESS NOTE    Data:  Nunu Falk is a 52 y.o. female who met with the undersigned for a scheduled individualoutpatient psychotherapy session from 1:00 - 1:45pm.      Clinical Maneuvering/Intervention:      Pt talked about how she is often arguing with others, feeling anxious, and feeling stressed. She is still working at her job despite worrying if she could do it. Anger management is still an issue, but she was assisted in recognizing proper ways to express anger, versus unhealthy ways. The pt was assisted in recognizing progress in order to show encouragement and promote motivation to keep making positive changes in life. She is less violent and more loving towards others. Being assertive instead of aggressive was a skill taught to her again today. Feelings were processed and validated. Self-care was discussed as something the pt can continue to do, including letting go of past issues and focusing on improving and being grateful for her loved ones in her life now.  The pt expressed gratitude for today's session.    Mental Status Exam  Hygiene:  good  Dress: appropriate  Attitude:  open to learning  Motor Activity: normal  Speech: normal  Mood: stressed  Affect:  congruent  Thought Processes: normal  Thought Content:  normal  Suicidal Thoughts:  not endorsed  Homicidal Thoughts:  not endorsed  Crisis Safety Plan: not needed   Hallucinations:  none      Patient's Support Network Includes:  family, friends      Progress toward goal: there is evidence to suggest that she is learning new ways to improve how to relate to others and in turn, to better manage her anger      Functional Status: moderate      Prognosis: good     Assessment      The pt presented as anxious and depressed, due mainly to struggles in relating to others. She still struggles to manage her anger, but is learning better ways to cope with it.      Plan      In order to prevent symptoms of anger and diminish symptoms of stress, the pt is to  "continue to use her \"adult\" or \"grown woman\" voice in order to communicate with others (ongoing).    Renetta Allen, PhD, LP     "

## 2019-02-01 ENCOUNTER — OFFICE VISIT (OUTPATIENT)
Dept: PSYCHIATRY | Facility: CLINIC | Age: 54
End: 2019-02-01

## 2019-02-01 DIAGNOSIS — R45.86 MOOD SWINGS: ICD-10-CM

## 2019-02-01 DIAGNOSIS — R45.4 ANGER: Primary | ICD-10-CM

## 2019-02-01 DIAGNOSIS — F41.9 ANXIETY: ICD-10-CM

## 2019-02-01 PROCEDURE — 90834 PSYTX W PT 45 MINUTES: CPT | Performed by: PSYCHOLOGIST
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
## 2019-02-15 ENCOUNTER — OFFICE VISIT (OUTPATIENT)
Dept: PSYCHIATRY | Facility: CLINIC | Age: 54
End: 2019-02-15

## 2019-02-15 DIAGNOSIS — R45.86 MOOD SWINGS: ICD-10-CM

## 2019-02-15 DIAGNOSIS — F41.9 ANXIETY: ICD-10-CM

## 2019-02-15 DIAGNOSIS — R45.4 ANGER: Primary | ICD-10-CM

## 2019-02-15 PROCEDURE — 90834 PSYTX W PT 45 MINUTES: CPT | Performed by: PSYCHOLOGIST

## 2019-02-23 NOTE — PROGRESS NOTES
PROGRESS NOTE    Data:  Nunu Falk is a 53 y.o. female who met with the undersigned for a scheduled individualoutpatient psychotherapy session from 1:00 - 1:45pm.      Clinical Maneuvering/Intervention:      Pt talked about struggling with stress, anxiety, and mood swings. She continues to argue often with her ex- (S) with whom she lives. She talked about worrying that she will lose her job or not be able to perform at work due to anxiety problems and mood swings. Evidence that she is capable of doing the job and in fact, has many skills to excel at it were identified/discussed. What she is learning about herself in order to better manage her moods were discussed as well; progress was noted. She talked about having less anger outbursts and feeling grateful to have a supportive boss. She did talk about arguments with S and this was discussed in detail in order to identify anything she may be doing to cause the stress and/or identify what she could be doing differently. She admitted to getting angry with him because she suspects that he may be flirting with other women, but that she flirts with other men as well. Education about healthy vs unhealthy relationships was provided. The pt expressed gratitude for today's session.    Mental Status Exam  Hygiene:  good  Dress: appropriate  Attitude:  open to learning  Motor Activity: normal  Speech: normal  Mood: level  Affect:  congruent  Thought Processes: normal  Thought Content:  normal  Suicidal Thoughts:  not endorsed  Homicidal Thoughts:  not endorsed  Crisis Safety Plan: not needed   Hallucinations:  none      Patient's Support Network Includes:  family, friends      Progress toward goal: there is evidence to suggest that she is becoming more confident as she works and makes her own money, but is still learning how to improve and maintain healthy relationships with others.      Functional Status: moderate      Prognosis: good     Assessment      The pt  presented as anxious, likely due to engaging in maladaptive thought patterns and struggling to get along with others. She still has mood swings and struggles to manage anger, though managing her mood has improved some.      Plan      In order to prevent symptoms of anger and diminish symptoms of anxiety, the pt is to give more thought to who she chooses to spend time and make efforts to keep/maintain healthy relationships with other as discussed today (ongoing).    Renetta Allen, PhD, LP

## 2019-03-01 ENCOUNTER — OFFICE VISIT (OUTPATIENT)
Dept: PSYCHIATRY | Facility: CLINIC | Age: 54
End: 2019-03-01

## 2019-03-01 DIAGNOSIS — F41.9 ANXIETY: Primary | ICD-10-CM

## 2019-03-01 DIAGNOSIS — R45.86 MOOD SWINGS: ICD-10-CM

## 2019-03-01 DIAGNOSIS — R45.4 ANGER: ICD-10-CM

## 2019-03-01 PROCEDURE — 90834 PSYTX W PT 45 MINUTES: CPT | Performed by: PSYCHOLOGIST
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
## 2019-03-26 ENCOUNTER — APPOINTMENT (OUTPATIENT)
Dept: GENERAL RADIOLOGY | Facility: HOSPITAL | Age: 54
End: 2019-03-26

## 2019-03-26 ENCOUNTER — HOSPITAL ENCOUNTER (EMERGENCY)
Facility: HOSPITAL | Age: 54
Discharge: HOME | End: 2019-03-26
Attending: EMERGENCY MEDICINE | Admitting: EMERGENCY MEDICINE

## 2019-03-26 VITALS
RESPIRATION RATE: 18 BRPM | DIASTOLIC BLOOD PRESSURE: 59 MMHG | HEIGHT: 65 IN | SYSTOLIC BLOOD PRESSURE: 129 MMHG | HEART RATE: 55 BPM | WEIGHT: 183 LBS | OXYGEN SATURATION: 97 % | BODY MASS INDEX: 30.49 KG/M2 | TEMPERATURE: 98.1 F

## 2019-03-26 DIAGNOSIS — M54.41 ACUTE BILATERAL LOW BACK PAIN WITH RIGHT-SIDED SCIATICA: Primary | ICD-10-CM

## 2019-03-26 LAB
BACTERIA UR QL AUTO: NORMAL /HPF
BILIRUB UR QL STRIP: NEGATIVE
CLARITY UR: CLEAR
COLOR UR: YELLOW
GLUCOSE UR STRIP-MCNC: NEGATIVE MG/DL
HGB UR QL STRIP.AUTO: NEGATIVE
HYALINE CASTS UR QL AUTO: NORMAL /LPF
KETONES UR QL STRIP: NEGATIVE
LEUKOCYTE ESTERASE UR QL STRIP.AUTO: NEGATIVE
NITRITE UR QL STRIP: NEGATIVE
PH UR STRIP.AUTO: 6 [PH] (ref 5–8)
PROT UR QL STRIP: NEGATIVE
RBC # UR: NORMAL /HPF
REF LAB TEST METHOD: NORMAL
SP GR UR STRIP: 1.02 (ref 1–1.03)
SQUAMOUS #/AREA URNS HPF: NORMAL /HPF
UROBILINOGEN UR QL STRIP: NORMAL
WBC UR QL AUTO: NORMAL /HPF

## 2019-03-26 PROCEDURE — 99283 EMERGENCY DEPT VISIT LOW MDM: CPT

## 2019-03-26 PROCEDURE — 81001 URINALYSIS AUTO W/SCOPE: CPT

## 2019-03-26 PROCEDURE — 72100 X-RAY EXAM L-S SPINE 2/3 VWS: CPT

## 2019-03-26 RX ORDER — CYCLOBENZAPRINE HCL 10 MG
10 TABLET ORAL 3 TIMES DAILY PRN
Qty: 21 TABLET | Refills: 0 | Status: SHIPPED | OUTPATIENT
Start: 2019-03-26

## 2019-03-26 RX ORDER — ATENOLOL AND CHLORTHALIDONE 100; 25 MG/1; MG/1
1 TABLET ORAL DAILY
COMMUNITY

## 2019-03-26 RX ORDER — ACETAMINOPHEN 500 MG
1000 TABLET ORAL ONCE
Status: DISCONTINUED | OUTPATIENT
Start: 2019-03-26 | End: 2019-03-26 | Stop reason: HOSPADM

## 2019-03-29 ENCOUNTER — OFFICE VISIT (OUTPATIENT)
Dept: PSYCHIATRY | Facility: CLINIC | Age: 54
End: 2019-03-29

## 2019-03-29 DIAGNOSIS — F33.9 EPISODE OF RECURRENT MAJOR DEPRESSIVE DISORDER, UNSPECIFIED DEPRESSION EPISODE SEVERITY (HCC): ICD-10-CM

## 2019-03-29 DIAGNOSIS — F43.9 FEELING STRESSED OUT: Primary | ICD-10-CM

## 2019-03-29 PROCEDURE — 90847 FAMILY PSYTX W/PT 50 MIN: CPT | Performed by: PSYCHOLOGIST

## 2019-03-31 NOTE — PROGRESS NOTES
PROGRESS NOTE    Data:  Nunu Falk is a 53 y.o. female who met with the undersigned with her boyfriend for a scheduled couples counseling outpatient psychotherapy session from 1:46 - 2:30pm.      Clinical Maneuvering/Intervention:      The pt came to session today with her ex- (S) with whom she lives and they have been together over 30 years. Even though they were  several years ago, they are in a relationship and having relationship problems. She explained that her intention to come together today is to work on their relationship. After a few minutes, S, admitted that they have problems and argue quite a bit, but he does not know what to do about it. The pt became tearful when talking about how she feels hurt by him and unloved. He expressed frustration with her because, as he could state, that he is trying to make it work. Several interventions were conducted: working to identify the sort of relationship that they want, identifying how she likely needs to feel loved (while he needs to feel respected), identifying how she needs to be more clear/forthcoming with what she needs from him and to ask for it, and how he can remind her that when she is upset that she needs to simply express what she needs (e.g., what she needs to hear from him or what he can do in order to help her feel better). These interventions were practiced in session and for the majority of the session since they both decided that they love each other and do not want to be with anyone else. When they started to argue or attack one another, they were stopped and reminded of another or different approach that is more effective. They are to practice these skills on their own outside of session. They are to discuss a possible label of the relationship outside of session (e.g., boyfriend/girlfriend) in order to solidify the framework of their relationship and give reason to work on issues. The pts expressed gratitude for today's  session.    Mental Status Exam  Hygiene:  good  Dress: appropriate  Attitude:  open to learning  Motor Activity: normal  Speech: normal  Mood: stressed  Affect:  congruent  Thought Processes: normal  Thought Content:  normal  Suicidal Thoughts:  not endorsed  Homicidal Thoughts:  not endorsed  Crisis Safety Plan: not needed   Hallucinations:  none      Patients' Support Network Includes:  family, friends      Progress toward goal: there is evidence to suggest that they both want to work on the relationship, beginning with improving their communication       Functional Status: moderate      Prognosis: good     Assessment      The pts presented as depressed and stressed in the relationship. Distress is likely due to lack of clarity of the relationship and impaired communication skills on both of their sides. They were open to learning new ways to communicate and seem motivated for counseling.      Plan      In order to diminish symptoms of depression and stress related to problems in the relationship, the pts are to practice the skills taught to them today and described above (ongoing) after deciding a label to describe the relationship they have now (as soon as feasible).    Renetta Allen, PhD, LP

## 2019-04-12 ENCOUNTER — OFFICE VISIT (OUTPATIENT)
Dept: PSYCHIATRY | Facility: CLINIC | Age: 54
End: 2019-04-12

## 2019-04-12 DIAGNOSIS — F31.60 BIPOLAR AFFECTIVE DISORDER, MIXED (HCC): Primary | ICD-10-CM

## 2019-04-12 PROCEDURE — 90834 PSYTX W PT 45 MINUTES: CPT | Performed by: PSYCHOLOGIST
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
## 2019-05-03 ENCOUNTER — OFFICE VISIT (OUTPATIENT)
Dept: PSYCHIATRY | Facility: CLINIC | Age: 54
End: 2019-05-03

## 2019-05-03 DIAGNOSIS — F31.60 BIPOLAR AFFECTIVE DISORDER, MIXED (HCC): Primary | ICD-10-CM

## 2019-05-03 PROCEDURE — 90834 PSYTX W PT 45 MINUTES: CPT | Performed by: PSYCHOLOGIST
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
## 2019-05-17 ENCOUNTER — OFFICE VISIT (OUTPATIENT)
Dept: PSYCHIATRY | Facility: CLINIC | Age: 54
End: 2019-05-17

## 2019-05-17 DIAGNOSIS — F31.60 BIPOLAR AFFECTIVE DISORDER, MIXED (HCC): Primary | ICD-10-CM

## 2019-05-17 PROCEDURE — 90834 PSYTX W PT 45 MINUTES: CPT | Performed by: PSYCHOLOGIST

## 2019-05-17 NOTE — PROGRESS NOTES
PROGRESS NOTE    Data:  Nunu Falk is a 53 y.o. female who met with the undersigned for a scheduled individualoutpatient psychotherapy session from 9:31 - 10:15am.      Clinical Maneuvering/Intervention:      The pt talked about difficulties making decisions, particularly about employment and finances. She has continued writing in her journal, a fairly new habit, but one that is helping her get thoughts and feelings out in order to help her manage her moods. She did the homework assignment as well, including positive things into her journal as she has a tendency to just write negatively. This helped her see her life as better than she otherwise would. Her core stressor as of late is work/finances. She expressed worry that if she continues to work, that she will have melt downs and/or not be able to keep her job. She is questioning if she should quite, work less, and/or go after getting disability money. Evidence of her abilities and points of growth thus far were revisited in order to assist her in letting go of worry. It has been an ongoing issue of her thinking poorly of herself (though progress has been made and discussed today). She responded to this by talking about how she feels very valued at work, is grateful for her boss, and likes several people at work (either co-workers or customers). She was assisted with seeing things in a different light. She was able to talk more about things in life she likes or appreciates and how happy she is to be a grandmother again to her daughter's new baby. The pt expressed gratitude for today's session.    Mental Status Exam  Hygiene:  good  Dress: appropriate  Attitude:  open to learning  Motor Activity: normal  Speech: normal  Mood: labile  Affect:  congruent  Thought Processes: normal  Thought Content:  normal  Suicidal Thoughts:  not endorsed  Homicidal Thoughts:  not endorsed  Crisis Safety Plan: not needed   Hallucinations:  none      Patient's Support Network  Includes:  family, friends      Progress toward goal: there is evidence to suggest that she is improving in managing her anger and mood swings      Functional Status: moderate      Prognosis: good     Assessment      The pt presented with issues of mood swings (being tearful or angry one moment/depressed, then calmer or nervous/anxious the next), though severity of these instances are slowly diminishing. She has symptoms congruent with bipolar affective disorder of a mixed type. She tends to benefit from CBT in combination with empowerment therapy, thus challenging her to solve her own issues, teaching her how to do so, and supporting her along the way (showing her evidence of strength and progress).      Plan      In order to better control mood swings, the pt will continue keeping a journal about how she feels (ongoing), continue enjoying her job/socializing with loved ones (ongoing), and continue with medication for mood as prescribed (ongoing). She is to work her plan of action in terms of getting her car fixed (issue addressed in previous sessions/something the pt wants to do) (as soon as feasible).    Renetta Allen, PhD, LP

## 2019-05-31 ENCOUNTER — OFFICE VISIT (OUTPATIENT)
Dept: PSYCHIATRY | Facility: CLINIC | Age: 54
End: 2019-05-31

## 2019-05-31 DIAGNOSIS — F31.60 BIPOLAR AFFECTIVE DISORDER, MIXED (HCC): Primary | ICD-10-CM

## 2019-05-31 PROCEDURE — 90834 PSYTX W PT 45 MINUTES: CPT | Performed by: PSYCHOLOGIST

## 2019-06-14 ENCOUNTER — OFFICE VISIT (OUTPATIENT)
Dept: PSYCHIATRY | Facility: CLINIC | Age: 54
End: 2019-06-14

## 2019-06-14 DIAGNOSIS — F31.60 BIPOLAR AFFECTIVE DISORDER, MIXED (HCC): Primary | ICD-10-CM

## 2019-06-14 PROCEDURE — 90834 PSYTX W PT 45 MINUTES: CPT | Performed by: PSYCHOLOGIST

## 2019-06-14 NOTE — PROGRESS NOTES
PROGRESS NOTE    Data:  Nunu Falk is a 53 y.o. female who met with the undersigned for a scheduled individualoutpatient psychotherapy session from 1:00 - 1:45pm.      Clinical Maneuvering/Intervention:      The pt talked about struggling with many things. A theme was a struggle with mood swings, feeling fine one moment, then angry the next. Current stressors were identified: her dog opening the fridge and eating her food, not getting along with S, stressing about finances, and having 'road rage.' Each issue was addressed, discussed, and processed. She brought up again how sad she feels that her son is going into the . She supports him serving the country, but feels sad that he is leaving. Feelings were processed and validated several times in session. Getting to the underlying meaning behind her suffering was conducted. She responded by admitting that she feels worthless and unloved. She gets angry easily because she feels threatened or disrespected. These feelings were validated as well. Then the conversation shifted to how she could change her own actions/mindset about things. She was assisted in identifying her own wants/needs instead of focusing on others' actions that bother her. After some time, and after calming upon processing stress/difficulties, she talked about how she could be better at managing her money. She also talked about doing well at work and getting a good evaluation by her supervisor. Ways to improve her life and decrease stress were discussed. She then talked about how she plans to return furniture she is renting (then buying a couple inexpensive pieces) and getting things out of storage so she does not need to keep paying for that. The pt calmed more towards the end of session and joked more. The pt expressed gratitude for today's session.    Mental Status Exam  Hygiene:  good  Dress: appropriate  Attitude:  open to learning  Motor Activity: normal  Speech: normal  Mood:  labile  Affect:  congruent  Thought Processes: normal  Thought Content:  normal  Suicidal Thoughts:  not endorsed  Homicidal Thoughts:  not endorsed  Crisis Safety Plan: not needed   Hallucinations:  none      Patient's Support Network Includes:  family, friends      Progress toward goal: there is evidence to suggest that she has been improving in managing her anger and mood swings, though progress is slow      Functional Status: moderate      Prognosis: good     Assessment      The pt presented with issues of mood swings (being tearful or angry one moment/depressed, then calmer or nervous/anxious the next), which seem exacerbated by assumptions that others do not care about her or are out to get her in some way. She struggles with self-esteem. She has symptoms congruent with bipolar affective disorder of a mixed type. She tends to benefit from CBT in combination with empowerment therapy, thus challenging her to solve her own issues, teaching her how to do so, and supporting her along the way (showing her evidence of strength and progress).      Plan      In order to better control mood swings, the pt will make a point to budget and look for ways to save some money such as her idea of no longer paying for things she does not need (this week).    Renetta Allen, PhD, LP

## 2019-06-28 ENCOUNTER — OFFICE VISIT (OUTPATIENT)
Dept: PSYCHIATRY | Facility: CLINIC | Age: 54
End: 2019-06-28

## 2019-06-28 DIAGNOSIS — F31.60 BIPOLAR AFFECTIVE DISORDER, MIXED (HCC): Primary | ICD-10-CM

## 2019-06-28 PROCEDURE — 90834 PSYTX W PT 45 MINUTES: CPT | Performed by: PSYCHOLOGIST
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
## 2019-07-12 ENCOUNTER — OFFICE VISIT (OUTPATIENT)
Dept: PSYCHIATRY | Facility: CLINIC | Age: 54
End: 2019-07-12

## 2019-07-12 DIAGNOSIS — F31.60 BIPOLAR AFFECTIVE DISORDER, MIXED (HCC): Primary | ICD-10-CM

## 2019-07-12 PROCEDURE — 90834 PSYTX W PT 45 MINUTES: CPT | Performed by: PSYCHOLOGIST

## 2019-07-16 NOTE — PROGRESS NOTES
PROGRESS NOTE    Data:  Nunu Falk is a 53 y.o. female who met with the undersigned for a scheduled individualoutpatient psychotherapy session from 1:00 - 1:45pm.      Clinical Maneuvering/Intervention:      The pt talked about struggling with anxiety, mood swings, and panic. She was tearful in session while talking about job stress and not getting along with a manager at work ([employer]). She expressed anger towards him and other people in her life. The pt was assisted further with anger management training, understanding the feelings underlying anger (feeling insecure, feeling hurt). Education about how she cannot control others, only herself was provided. She was assisted in seeing her improvements in choosing to spend time with those who are good to her and even going so far as to reach out to an old friend who is positive, builds her up, and listens to her. Self-esteem issues were addressed and she was assisted in talking about loved ones and reasons why they like or love her. She was able to note how she is a valued employee by another manager and valued by more than one co-worker. She was assisted with talking in details why people tend to like her in general: she is funny, wittty, fun, hard-working, etc. The pt was assisted in recognizing progress in order to show encouragement and promote motivation to keep making positive changes in life. She is improving in mood and mood management as she chooses to spend time with those who lift her up/encourage her and she is sounding more appreciative of things/people in her life. She was asked to reflect on this progress in between sessions and look for additional ways to take stress off of herself. The pt expressed gratitude for today's session.    Mental Status Exam  Hygiene:  good  Dress: appropriate  Attitude:  open to learning  Motor Activity: normal  Speech: normal  Mood: labile  Affect:  congruent  Thought Processes: normal  Thought Content:   normal  Suicidal Thoughts:  not endorsed  Homicidal Thoughts:  not endorsed  Crisis Safety Plan: not needed   Hallucinations:  none      Patient's Support Network Includes:  family, friends      Progress toward goal: there is evidence to suggest that she has been improving in managing her anger and mood swings via improving how she relates to those close to her.      Functional Status: moderate      Prognosis: good     Assessment      The pt presented with issues of mood swings (being tearful or angry one moment/depressed, then calmer or nervous/anxious the next), congruent with bipolar disorder.      Plan      In order to better control mood swings, the pt will make a point to practice what was taught and practiced in session today related to choosing to spend time with those who treat her well and give thought to progress as discussed today, looking for additional ways to take stress off of herself (this week).    Renetta Allen, PhD, LP

## 2019-07-26 ENCOUNTER — OFFICE VISIT (OUTPATIENT)
Dept: PSYCHIATRY | Facility: CLINIC | Age: 54
End: 2019-07-26

## 2019-07-26 DIAGNOSIS — F31.60 BIPOLAR AFFECTIVE DISORDER, MIXED (HCC): Primary | ICD-10-CM

## 2019-07-26 PROCEDURE — 90834 PSYTX W PT 45 MINUTES: CPT | Performed by: PSYCHOLOGIST

## 2019-08-23 ENCOUNTER — OFFICE VISIT (OUTPATIENT)
Dept: PSYCHIATRY | Facility: CLINIC | Age: 54
End: 2019-08-23

## 2019-08-23 DIAGNOSIS — F31.60 BIPOLAR AFFECTIVE DISORDER, MIXED (HCC): Primary | ICD-10-CM

## 2019-08-23 DIAGNOSIS — Z62.819 H/O ABUSE IN CHILDHOOD: ICD-10-CM

## 2019-08-23 PROCEDURE — 90834 PSYTX W PT 45 MINUTES: CPT | Performed by: PSYCHOLOGIST
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
## 2019-09-13 ENCOUNTER — OFFICE VISIT (OUTPATIENT)
Dept: PSYCHIATRY | Facility: CLINIC | Age: 54
End: 2019-09-13

## 2019-09-13 DIAGNOSIS — Z62.819 H/O ABUSE IN CHILDHOOD: ICD-10-CM

## 2019-09-13 DIAGNOSIS — F31.60 BIPOLAR AFFECTIVE DISORDER, MIXED (HCC): Primary | ICD-10-CM

## 2019-09-13 PROCEDURE — 90834 PSYTX W PT 45 MINUTES: CPT | Performed by: PSYCHOLOGIST
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
## 2019-10-11 ENCOUNTER — OFFICE VISIT (OUTPATIENT)
Dept: PSYCHIATRY | Facility: CLINIC | Age: 54
End: 2019-10-11

## 2019-10-11 DIAGNOSIS — Z62.819 H/O ABUSE IN CHILDHOOD: ICD-10-CM

## 2019-10-11 DIAGNOSIS — F31.60 BIPOLAR AFFECTIVE DISORDER, MIXED (HCC): Primary | ICD-10-CM

## 2019-10-11 PROCEDURE — 90834 PSYTX W PT 45 MINUTES: CPT | Performed by: PSYCHOLOGIST
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
## 2019-10-25 ENCOUNTER — OFFICE VISIT (OUTPATIENT)
Dept: PSYCHIATRY | Facility: CLINIC | Age: 54
End: 2019-10-25

## 2019-10-25 DIAGNOSIS — F31.60 BIPOLAR AFFECTIVE DISORDER, MIXED (HCC): Primary | ICD-10-CM

## 2019-10-25 PROCEDURE — 90834 PSYTX W PT 45 MINUTES: CPT | Performed by: PSYCHOLOGIST

## 2019-10-25 NOTE — PROGRESS NOTES
PROGRESS NOTE    Data:  Nunu Falk is a 53 y.o. female who met with the undersigned for a scheduled individualoutpatient psychotherapy session from 10:16 - 11:01am.      Clinical Maneuvering/Intervention:      The pt talked about recent stressors: feeling mistreated at work by customers, feeling betrayed by her boss at work, and arguing with family members. Stressors were processed individually and in detail. Venting of frustrations was conducted in order to help the pt feel less tense emotionally and gain insight into issues. Feelings were processed and validated several times in session. The pt was assisted in recognizing progress in order to show encouragement and promote motivation to keep making positive changes in life. She was assisted in noting these things for herself and was able to do so after a minute. She talked about being more assertive than aggressive with her boss, that she is making more of a point to show love towards others (e.g., her daughter, despite feeling frustrated with her daughter), and taking more time/noticing more where others are coming from so she does not having to take their negative actions so personally. Anger management skills training therefore, was continued. Self-care, appreciating herself more, etc were also discussed and progress was highlighted. Homework was assigned tailored to pt's needs.  The pt expressed gratitude for today's session.    Mental Status Exam  Hygiene:  good  Dress: appropriate  Attitude:  open to learning   Motor Activity: normal  Speech: normal  Mood: slightly irritable  Affect:  congruent  Thought Processes: normal  Thought Content:  normal  Suicidal Thoughts:  not endorsed  Homicidal Thoughts:  not endorsed  Crisis Safety Plan: not needed   Hallucinations:  none      Patient's Support Network Includes:  family, friends      Progress toward goal: there is evidence to suggest that she is improving in her ability to manage anger      Functional Status:  moderate      Prognosis: good     Assessment      The pt presented with issues of mood swings (being tearful or angry one moment/depressed, then calmer or nervous/anxious the next), congruent with bipolar affect disorder, mixed type. Anger management training seems to help her better manage her moods and connect better with others.    Plan      In order to diminish depression, anxiety, and irritability, the pt is to make a point to show more love towards her daughter (next few weeks), write in her journal but including what she is learning/positive things (ongoing) and continue with counseling (ongoing).    Renetta Allen, PhD, LP

## 2019-11-11 ENCOUNTER — OFFICE VISIT (OUTPATIENT)
Dept: PSYCHIATRY | Facility: CLINIC | Age: 54
End: 2019-11-11

## 2019-11-11 DIAGNOSIS — F31.60 BIPOLAR AFFECTIVE DISORDER, MIXED (HCC): Primary | ICD-10-CM

## 2019-11-11 PROCEDURE — 90834 PSYTX W PT 45 MINUTES: CPT | Performed by: PSYCHOLOGIST

## 2019-11-11 NOTE — PROGRESS NOTES
PROGRESS NOTE    Data:  Nunu Falk is a 53 y.o. female who met with the undersigned for a scheduled individualoutpatient psychotherapy session from 9:35 - 10:20am.      Clinical Maneuvering/Intervention:      This session was one of the few where the pt was in a better mood. She talked about things happening that may be frustrating, but did not report many problems in life compared to other sessions. The pt was assisted in recognizing progress in order to show encouragement and promote motivation to keep making positive changes in life. She is catching up on bills, getting along better with S, getting along better with family members, and recently had an increase in income. She responded by saying that something bad must be about to happen. Maladaptive thought patterns were identified, challenged, and evaluated for validity in order to allow for the pt to chose different and more adaptive ways of thinking. She was then able to talk about how she may actually have some responsibility for things going better in her life. Progress in the bigger picture was discussed as she could note how she is handling anger/irritability much better. She knows better when she needs to walk away, for example. The pt's strengths were identified in order to help identify abilities to use to better face/overcome challenges. She is good at boundary setting with others. Room for growth was also discussed: making a point to connect more with her daughter and show more love towards her as the pt admitted that she tends to show more love to her son than her daughter. The pt plans to encourage her daughter more and in fact, has started doing this lately. The pt expressed gratitude for today's session.    Mental Status Exam  Hygiene:  good  Dress: appropriate  Attitude:  cooperative  Motor Activity: normal  Speech: normal  Mood: level  Affect:  congruent  Thought Processes: normal  Thought Content:  normal  Suicidal Thoughts:  not  endorsed  Homicidal Thoughts:  not endorsed  Crisis Safety Plan: not needed   Hallucinations:  none      Patient's Support Network Includes:  family, friends      Progress toward goal: there is evidence to suggest that her mood has been better and more stable lately      Functional Status: moderate      Prognosis: good     Assessment      The pt presented with bipolar affect disorder, mixed type over the course of sessions. She seems to be more stable as of late.    Plan      In order to prevent distress such as mood swings and irritability, the pt is to make a point to be vulnerable and connect more with loved ones, her daughter in particular (ongoing).    Renetta Allen, PhD, LP

## 2020-02-07 ENCOUNTER — OFFICE VISIT (OUTPATIENT)
Dept: PSYCHIATRY | Facility: CLINIC | Age: 55
End: 2020-02-07

## 2020-02-07 DIAGNOSIS — F31.60 BIPOLAR AFFECTIVE DISORDER, MIXED (HCC): Primary | ICD-10-CM

## 2020-02-07 PROCEDURE — 90834 PSYTX W PT 45 MINUTES: CPT | Performed by: PSYCHOLOGIST

## 2020-02-07 NOTE — PROGRESS NOTES
PROGRESS NOTE    Data:  Nunu Falk is a 54 y.o. female who met with the undersigned for a scheduled individualoutpatient psychotherapy session from 1:00 - 1:45pm.       Clinical Maneuvering/Intervention:      The pt talked about recent stressors: foot pain/swelling, bone disease, surgery on foot, terminated from job, and mood swings. Stressors were processed individually and in detail. Venting of frustrations was conducted in order to help the pt feel less tense emotionally and gain insight into issues. Feelings were processed and validated several times in session. The pt was assisted in recognizing progress in order to show encouragement and promote motivation to keep making positive changes in life. She is having less outbursts, thinking things through more before responding, and acting more assertively. The pt was also able to note her own progress more (less help in session to do so). She was challenged to note things she may be doing to contribute to her mood swings and/or helping herself with mood management. The pt tended to be more positive today and talk more assertively than usual. The pt's strengths were identified in order to help identify abilities to use to better face/overcome challenges. Progress was reiterated. It was noted that she avis well with things using humor and that she is noticing more things of which to be grateful (being awarded disability money). The pt expressed gratitude for today's session.    Mental Status Exam  Hygiene:  good  Dress: appropriate  Attitude:  cooperative  Motor Activity: normal  Speech: normal  Mood: mixed (elevated and irritated)  Affect:  congruent  Thought Processes: normal  Thought Content:  normal  Suicidal Thoughts:  not endorsed  Homicidal Thoughts:  not endorsed  Crisis Safety Plan: not needed   Hallucinations:  none      Patient's Support Network Includes:  family, friends      Progress toward goal: there is evidence to suggest that her mood has been  stabilizing over time; she is improving in anger management skills.       Functional Status: moderate      Prognosis: good     Assessment      The pt presented with bipolar affect disorder, mixed type over the course of sessions. She seems to be stabilizing over time, though irritability and mood swings still occur. She tends to benefit from anger management therapy. She tends to benefit from empowerment therapy as she says that counseling really helps and she is saying more positive/assertive things than in past sessions (without prompting).    Plan      In order to prevent distress such as mood swings and irritability, the pt is to make a point to be vulnerable and connect more with loved ones, her daughter in particular (ongoing). She is to discuss her progress with loved ones of whom she trusts in order to continue to feel strong and encouraged (this week, ongoing as needed).    Renetta Allen, PhD, LP

## 2020-02-21 ENCOUNTER — OFFICE VISIT (OUTPATIENT)
Dept: PSYCHIATRY | Facility: CLINIC | Age: 55
End: 2020-02-21

## 2020-02-21 DIAGNOSIS — F31.60 BIPOLAR AFFECTIVE DISORDER, MIXED (HCC): Primary | ICD-10-CM

## 2020-02-21 PROCEDURE — 90834 PSYTX W PT 45 MINUTES: CPT | Performed by: PSYCHOLOGIST

## 2020-02-21 NOTE — PROGRESS NOTES
PROGRESS NOTE    Data:  Nunu Falk is a 54 y.o. female who met with the undersigned for a scheduled individualoutpatient psychotherapy session from 1:00 - 1:45pm.       Clinical Maneuvering/Intervention:      The pt talked about recent stressors: foot pain/swelling, bone infection, surgery on foot, financial strain, and mood swings. Stressors were processed individually and in detail. Venting of frustrations was conducted in order to help the pt feel less tense emotionally and gain insight into issues. Feelings were processed and validated several times in session. She was assisted with facing and dealing with core stressors such as healing physically an returning to work. She was assisted with building up confidence to work as she started doubting her abilities. With encouragement, the pt was able to talk through steps of what she will do to return to work, but not work too many hours to prevent her from getting her disability money. Perspective taking was conducted and the pt was able to use humor (a coping skill the pt has) to see her situation as less dire. She smiled more towards the end of the session and talked about loving her children and things her mother says that are funny.The pt was assisted in recognizing progress in order to show encouragement and promote motivation to keep making positive changes in life. She is having less anger outbursts.  Ways to cope with distress were delineated towards the end of session: humor, dogs, puzzles in a book and on her phone, tv sound in the background for 'company.' The pt expressed gratitude for today's session.    Mental Status Exam  Hygiene:  good  Dress: appropriate  Attitude:  cooperative  Motor Activity: normal  Speech: normal  Mood: mixed   Affect:  congruent  Thought Processes: normal  Thought Content:  normal  Suicidal Thoughts:  not endorsed  Homicidal Thoughts:  not endorsed  Crisis Safety Plan: not needed   Hallucinations:  none      Patient's Support  Network Includes:  family, friends      Progress toward goal: there is evidence to suggest that her mood has been stabilizing over time; she is improving in anger management skills.       Functional Status: moderate      Prognosis: good     Assessment      The pt presented with bipolar affect disorder, mixed type over the course of sessions. She seems to be stabilizing over time, though irritability and mood swings still occur. She tends to benefit from anger management therapy. She tends to benefit from empowerment therapy as she says that counseling really helps and she is saying more positive/assertive things than in past sessions (without prompting).    Plan      In order to prevent distress such as mood swings and irritability, the pt is to put into action her plan of healing and returning to work (as soon as feasible) and use coping skills reviewed today as needed (ongoing).    Renetta Allen, PhD, LP

## 2020-04-24 ENCOUNTER — OFFICE VISIT (OUTPATIENT)
Dept: PSYCHIATRY | Facility: CLINIC | Age: 55
End: 2020-04-24

## 2020-04-24 DIAGNOSIS — F31.60 BIPOLAR AFFECTIVE DISORDER, MIXED (HCC): Primary | ICD-10-CM

## 2020-04-24 PROCEDURE — 90834 PSYTX W PT 45 MINUTES: CPT | Performed by: PSYCHOLOGIST

## 2020-04-26 NOTE — PROGRESS NOTES
PROGRESS NOTE    Data:  Nunu Falk is a 54 y.o. female who met with the undersigned for a scheduled individualoutpatient psychotherapy session from 1:00 - 1:45pm.       Clinical Maneuvering/Intervention:      The pt talked about recent stressors: mood swings, anger problems, and not feeling loved. Stressors were processed individually and in detail. Venting of frustrations was conducted in order to help the pt feel less tense emotionally and gain insight into issues. Feelings were processed and validated several times in session. She was assisted with facing and dealing with core stressors such as healing physically an returning to work. She was assisted with building up confidence to work as she started doubting her abilities. With encouragement, the pt was able to talk through steps of what she will do to return to work, but not work too many hours to prevent her from getting her disability money. Perspective taking was conducted and the pt was able to see herself in a more positive light. The pt was assisted in recognizing progress in order to show encouragement and promote motivation to keep making positive changes in life. She does handle anger better. This was the first session too, that the pt expressed that she likes herself. Again, she was commended for her growth. The pt expressed gratitude for today's session.    Mental Status Exam  Hygiene:  good  Dress: appropriate  Attitude:  cooperative  Motor Activity: normal  Speech: normal  Mood: mixed   Affect:  congruent  Thought Processes: normal  Thought Content:  normal  Suicidal Thoughts:  not endorsed  Homicidal Thoughts:  not endorsed  Crisis Safety Plan: not needed   Hallucinations:  none      Patient's Support Network Includes:  family, friends      Progress toward goal: there is evidence to suggest that her mood has been stabilizing over time; she is improving in anger management skills.       Functional Status: moderate      Prognosis: good      Assessment      The pt presented with bipolar affect disorder, mixed type over the course of sessions. She seems to be stabilizing over time, though irritability and mood swings still occur. She tends to benefit from anger management therapy. She tends to benefit from empowerment therapy as she says that this sort of counseling really helps her feel better/stronger.    Plan      In order to prevent distress such as mood swings and irritability, the pt is to put into action her plan of healing and returning to work (as soon as feasible) and use coping skills reviewed today and in previous sessions as needed (ongoing).    Renetta Allen, PhD, LP

## 2020-05-15 ENCOUNTER — OFFICE VISIT (OUTPATIENT)
Dept: PSYCHIATRY | Facility: CLINIC | Age: 55
End: 2020-05-15

## 2020-05-15 DIAGNOSIS — F31.60 BIPOLAR AFFECTIVE DISORDER, MIXED (HCC): Primary | ICD-10-CM

## 2020-05-15 PROCEDURE — 90834 PSYTX W PT 45 MINUTES: CPT | Performed by: PSYCHOLOGIST

## 2020-05-15 NOTE — PROGRESS NOTES
PROGRESS NOTE    Data:  Nunu Falk is a 54 y.o. female who met with the undersigned for a scheduled individualoutpatient psychotherapy session from 1:00 - 1:45pm.       Clinical Maneuvering/Intervention:      The pt talked about recent stressors: feeling stuck at home due to Covid-19, her boyfriend going back to work so she will be home alone, and her son going into the  in a couple of months. Stressors were processed individually and in detail. Venting of frustrations was conducted in order to help the pt feel less tense emotionally and gain insight into issues. Feelings were processed and validated several times in session. She was quite tearful when she talked about her son going to basic training and boot camp. Processing this change in her life was conducted. Coping by finding meaning in it and identifying things of which she can look forward were discussed: being proud of him, hopeful about him having good experiences, looking forward to seeing him grow etc. The pt did point out things she is doing well without assistance today. She talked about wanting to get really angry at her daughter and 'go off on her,' but she did not do it. She noted other times she controlled her anger well and she was commended for this. The pt was assisted in recognizing progress in order to show encouragement and promote motivation to keep making positive changes in life. She is getting better at accepting people for who they are, finding sherry in life, and valuing herself more. The pt expressed gratitude for today's session.    Mental Status Exam  Hygiene:  good  Dress: appropriate  Attitude:  cooperative  Motor Activity: normal  Speech: normal  Mood: mixed   Affect:  congruent  Thought Processes: normal  Thought Content:  normal  Suicidal Thoughts:  not endorsed  Homicidal Thoughts:  not endorsed  Crisis Safety Plan: not needed   Hallucinations:  none      Patient's Support Network Includes:  family, friends      Progress  toward goal: there is evidence to suggest that her mood has been stabilizing over time, but her son going into the  has set her back some      Functional Status: moderate      Prognosis: good     Assessment      The pt presented with bipolar affect disorder, mixed type over the course of sessions. She has improved in coping skills over time, but seems to have had a setback with her son going into the , particularly because he would be leaving much sooner than she thought.    Plan      In order to prevent distress such as mood swings and irritability, the pt is to use coping skills reviewed today and in previous sessions as needed (ongoing) and continue noticing times she controlled her anger and other points of progress (ongoing).    Renetta Allen, PhD, LP

## 2020-05-28 ENCOUNTER — OFFICE VISIT (OUTPATIENT)
Dept: PSYCHIATRY | Facility: CLINIC | Age: 55
End: 2020-05-28

## 2020-05-28 DIAGNOSIS — Z62.819 H/O ABUSE IN CHILDHOOD: ICD-10-CM

## 2020-05-28 DIAGNOSIS — F31.60 BIPOLAR AFFECTIVE DISORDER, MIXED (HCC): Primary | ICD-10-CM

## 2020-05-28 PROCEDURE — 90834 PSYTX W PT 45 MINUTES: CPT | Performed by: PSYCHOLOGIST
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
## 2020-06-19 ENCOUNTER — OFFICE VISIT (OUTPATIENT)
Dept: PSYCHIATRY | Facility: CLINIC | Age: 55
End: 2020-06-19

## 2020-06-19 DIAGNOSIS — Z62.819 H/O ABUSE IN CHILDHOOD: ICD-10-CM

## 2020-06-19 DIAGNOSIS — F31.60 BIPOLAR AFFECTIVE DISORDER, MIXED (HCC): Primary | ICD-10-CM

## 2020-06-19 PROCEDURE — 90834 PSYTX W PT 45 MINUTES: CPT | Performed by: PSYCHOLOGIST

## 2020-06-19 NOTE — PROGRESS NOTES
PROGRESS NOTE    Data:  Nunu Falk is a 54 y.o. female who met with the undersigned for a scheduled individualoutpatient psychotherapy session from 11:02 - 11:50am.      Clinical Maneuvering/Intervention:      The pt talked about recent stressors: feeling distressed about past trauma, arguing with family members, and feeling depressed. Stressors were processed individually and in detail. Venting of frustrations was conducted in order to help the pt feel less tense emotionally and gain insight into issues. Feelings were processed and validated several times in session. She was assisted with making sense of her trauma. Many issues were covered in discussions: noticing how she survived, getting closure, letting go of anger/resentments in order to heal, and noticing how far she has come. The pt then talked about some positive things happening, mentioning things of which she is proud such as standing up for herself. The pt was assisted in recognizing progress in order to show encouragement and promote motivation to keep making positive changes in life. She is becoming more assertive (and less aggressive or on the other side of the spectrum, less passive). Homework was assigned tailored to pt's needs. The pt expressed gratitude for today's session.    Mental Status Exam  Hygiene:  good  Dress: appropriate  Attitude:  cooperative  Motor Activity: normal  Speech: normal  Mood: mixed   Affect:  congruent  Thought Processes: normal  Thought Content:  normal  Suicidal Thoughts:  not endorsed  Homicidal Thoughts:  not endorsed  Crisis Safety Plan: not needed   Hallucinations:  none      Patient's Support Network Includes:  family, friends      Progress toward goal: there is evidence to suggest that her mood has been stabilizing over time, but her son going into the  has set her back some      Functional Status: moderate      Prognosis: good     Assessment      The pt presented with bipolar affect disorder, mixed type  over the course of sessions. She often has mood swings, but is learning to cope better with mood swings over time.     Plan      In order to prevent distress such as mood swings and irritability, the pt is to use coping skills reviewed today and in previous sessions as needed (ongoing) and continue noticing points of progress where she controls her anger and/or is kinder to herself (ongoing).    Renetta Allen, PhD, LP

## 2020-07-10 ENCOUNTER — OFFICE VISIT (OUTPATIENT)
Dept: PSYCHIATRY | Facility: CLINIC | Age: 55
End: 2020-07-10

## 2020-07-10 DIAGNOSIS — F31.60 BIPOLAR AFFECTIVE DISORDER, MIXED (HCC): Primary | ICD-10-CM

## 2020-07-10 PROCEDURE — 90834 PSYTX W PT 45 MINUTES: CPT | Performed by: PSYCHOLOGIST
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
## 2020-07-31 ENCOUNTER — OFFICE VISIT (OUTPATIENT)
Dept: PSYCHIATRY | Facility: CLINIC | Age: 55
End: 2020-07-31

## 2020-07-31 DIAGNOSIS — F31.60 BIPOLAR AFFECTIVE DISORDER, MIXED (HCC): Primary | ICD-10-CM

## 2020-07-31 PROCEDURE — 90837 PSYTX W PT 60 MINUTES: CPT | Performed by: PSYCHOLOGIST
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
## 2020-08-31 ENCOUNTER — OFFICE VISIT (OUTPATIENT)
Dept: PSYCHIATRY | Facility: CLINIC | Age: 55
End: 2020-08-31

## 2020-08-31 DIAGNOSIS — F31.60 BIPOLAR AFFECTIVE DISORDER, MIXED (HCC): Primary | ICD-10-CM

## 2020-08-31 PROCEDURE — 90834 PSYTX W PT 45 MINUTES: CPT | Performed by: PSYCHOLOGIST
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
## 2020-09-11 ENCOUNTER — OFFICE VISIT (OUTPATIENT)
Dept: PSYCHIATRY | Facility: CLINIC | Age: 55
End: 2020-09-11

## 2020-09-11 DIAGNOSIS — R45.86 MOOD SWINGS: ICD-10-CM

## 2020-09-11 DIAGNOSIS — F31.60 BIPOLAR AFFECTIVE DISORDER, MIXED (HCC): Primary | ICD-10-CM

## 2020-09-11 PROCEDURE — 90834 PSYTX W PT 45 MINUTES: CPT | Performed by: PSYCHOLOGIST

## 2020-09-11 NOTE — PROGRESS NOTES
PROGRESS NOTE    Data:  Nunu Falk is a 54 y.o. female who met with the undersigned for a scheduled telephone psychotherapy session from 11:00 - 11:50am.    The pt consented to a telephone visit.      Clinical Maneuvering/Intervention:      The pt talked about recent stressors, mainly family related. She talked about having arguments with her mother and her daughter. The pt became tearful while talking about the events, expressing how hurt she felt and hopeless that they would love her the way she needs. Maladaptive thought patterns were identified, challenged, and evaluated for validity in order to allow for the pt to chose different and more adaptive ways of thinking.Stressors were processed individually and in detail. Venting of frustrations was conducted in order to help the pt feel less tense emotionally and gain insight into issues. Feelings were processed and validated several times in session. Perspective taking was conducted multiple times in session. She was assisted in seeing how certain things her daughter said were likely her attempt to get love from the pt. Patterns repeating between the pt and pt's mother, and then with the pt and pt's daughter were noted. Breaking the cycle of types of poor communication was discussed; examples were provided. Role playing was conducted in order to help the pt gain insight into how to improve her communication skills, decrease tension with the other person, and notice things the pt may be doing (and needs to stop doing) in order to improve the quality of the relationship. Homework was assigned tailored to pt's needs. The pt expressed gratitude for today's session.    Mental Status Exam  Hygiene:  good  Dress: appropriate  Attitude:  cooperative  Motor Activity: normal  Speech: normal  Mood: sad and down  Affect:  congruent  Thought Processes: normal  Thought Content:  normal  Suicidal Thoughts:  not endorsed  Homicidal Thoughts:  not endorsed  Crisis Safety Plan:  not needed   Hallucinations:  none      Patient's Support Network Includes:  family, friends      Progress toward goal: there is evidence to suggest that she is learning to cope with stress over time, though recent events seem to have set her back      Functional Status: moderate      Prognosis: good     Assessment      The pt presented with bipolar affect disorder, mixed type. She is often easily hurt and/or easily angered. Improving communication seems to be one beneficial way to help the pt connect better with others and in turn, experience better management of moods.     Plan      In order to prevent distress such as mood swings and irritability, the pt is to focus on improving her relationship with her daughter and her mother as practiced in session today (ongoing, as long as needed).     Renetta Allen, PhD, LP

## 2020-09-25 ENCOUNTER — OFFICE VISIT (OUTPATIENT)
Dept: PSYCHIATRY | Facility: CLINIC | Age: 55
End: 2020-09-25

## 2020-09-25 DIAGNOSIS — R45.86 MOOD SWINGS: ICD-10-CM

## 2020-09-25 DIAGNOSIS — Z62.819 H/O ABUSE IN CHILDHOOD: ICD-10-CM

## 2020-09-25 DIAGNOSIS — F31.60 BIPOLAR AFFECTIVE DISORDER, MIXED (HCC): Primary | ICD-10-CM

## 2020-09-25 PROCEDURE — 90834 PSYTX W PT 45 MINUTES: CPT | Performed by: PSYCHOLOGIST

## 2020-09-25 NOTE — PROGRESS NOTES
PROGRESS NOTE    Data:  Nunu Falk is a 54 y.o. female who met with the undersigned for a scheduled telephone psychotherapy session from 11:05 - 11:55am.    The pt consented to a telephone visit.      Clinical Maneuvering/Intervention:      The pt talked about recent stressors, mainly family related. She still struggles with anxiety and depression. She talked about feeling on edge lately. Stressors were processed individually and in detail. Venting of frustrations was conducted in order to help the pt feel less tense emotionally and gain insight into issues. Feelings were processed and validated several times in session. Similar to previous sessions, difficulties in relationships with others (mother, stepfather, ex-/roommate, and her daughter) was the theme of the session and noted as her main source of distress in life. Several things were practiced and discussed in session today: identifying healthy relationships, noting how she needs to focus on the good in both of her children despite them being different, and working on accepting her mother as is/not trying to change her. The difficulty in trying (but not getting) her mother's emotional validation for things was normalized. The pt was also assisted in seeing how her daughter is similar to her, but that this is quite useful in the pt's growth. Opportunity to heal from the pt's past, but giving her daughter the love she (the pt) did not getting was highlighted and discussed in detail. Other pertinent issues in the pt's life, such as valuing herself, marcelino in God, and having certain gifts were given attention today as well. This was conducted in part, to help improve the pt's self-worth and purpose in life (and in turn, diminish depression). The pt expressed gratitude for today's session.    Mental Status Exam  Hygiene:  good  Dress: appropriate  Attitude:  cooperative  Motor Activity: normal  Speech: normal  Mood: sad and down  Affect:   congruent  Thought Processes: normal  Thought Content:  normal  Suicidal Thoughts:  not endorsed  Homicidal Thoughts:  not endorsed  Crisis Safety Plan: not needed   Hallucinations:  none      Patient's Support Network Includes:  family, friends      Progress toward goal: there is evidence to suggest that she is learning to cope with stress over time, though recent events seem to have set her back      Functional Status: moderate      Prognosis: good     Assessment      The pt presented with bipolar affect disorder, mixed type. She is often easily hurt and/or easily angered. Improving communication seems to be one beneficial way to help the pt connect better with others and in turn, experience better management of moods.     Plan      In order to prevent distress such as mood swings and irritability, the pt is to focus on continuing to practice communicating lovingly and assertively as the case may be with those close to her, starting with her daughter (next few weeks, longer as needed). She is to make special effort to not expect her mother to change and/or validate feelings if she has not tended to do this much in life (next few weeks, longer as needed).     Renetta Allen, PhD, LP

## 2020-10-02 ENCOUNTER — OFFICE VISIT (OUTPATIENT)
Dept: PSYCHIATRY | Facility: CLINIC | Age: 55
End: 2020-10-02

## 2020-10-02 DIAGNOSIS — F31.60 BIPOLAR AFFECTIVE DISORDER, MIXED (HCC): Primary | ICD-10-CM

## 2020-10-02 PROCEDURE — 90834 PSYTX W PT 45 MINUTES: CPT | Performed by: PSYCHOLOGIST

## 2020-10-02 NOTE — PROGRESS NOTES
PROGRESS NOTE    Data:  Nunu Falk is a 54 y.o. female who met with the undersigned for a scheduled telephone psychotherapy session from 11:05 - 11:50am.    The pt consented to a telephone visit.      Clinical Maneuvering/Intervention:      The pt talked about recent stressors, mainly family and relationship based related. She talked about arguing with her mother and feeling hurt by her. She talked about feeling frustrated with her son who she questions about what he is doing on his own time (making the comment that he looked a little pale) and that she continues to not talk much to her stepfather). Stressors were processed individually and in detail. Venting of frustrations was conducted in order to help the pt feel less tense emotionally and gain insight into issues. Feelings were processed and validated several times in session. The pt was assisted in recognizing progress in order to show encouragement and promote motivation to keep making positive changes in life. She is getting along better with her ex-boyfriend and roommate S. And even though there are differences between herself and her mother, they have been arguing less. The pt was assisted with seeing how even though she can feel stressed by her mother, there are things the pt may do to stress her mother as well. The pt did well owning the latter and was commended for owning her part. When the pt started saying anything positive about people close to her, this was further encouraged by expanding on what she was saying in order to help with healthy perspective taking. Homework was assigned tailored to pt's needs (e.g., essentially continuing doing what she is doing related to her progress in managing her mood). The pt expressed gratitude for today's session.    Mental Status Exam  Hygiene:  good  Dress: appropriate  Attitude:  cooperative  Motor Activity: normal  Speech: normal  Mood: calmer  Affect:  congruent  Thought Processes: normal  Thought Content:   normal  Suicidal Thoughts:  not endorsed  Homicidal Thoughts:  not endorsed  Crisis Safety Plan: not needed   Hallucinations:  none      Patient's Support Network Includes:  family, friends      Progress toward goal: there is evidence to suggest that she is learning to cope better with distress and better at managing anger over time      Functional Status: moderate      Prognosis: good     Assessment      The pt presented with bipolar affect disorder, mixed type. She is often easily hurt and/or easily angered. Improving communication seems to be one beneficial way to help the pt connect better with others and in turn, experience better management of moods.     Plan      In order to prevent distress such as mood swings and irritability, the pt is to focus on continuing to practice communicating lovingly and assertively with family members and S (ongoing). She is to give thought to giving herself more credit for her recent progress (this week).    Renetta Allen, PhD, LP

## 2020-10-09 ENCOUNTER — OFFICE VISIT (OUTPATIENT)
Dept: PSYCHIATRY | Facility: CLINIC | Age: 55
End: 2020-10-09

## 2020-10-09 DIAGNOSIS — F31.60 BIPOLAR AFFECTIVE DISORDER, MIXED (HCC): Primary | ICD-10-CM

## 2020-10-09 PROCEDURE — 90834 PSYTX W PT 45 MINUTES: CPT | Performed by: PSYCHOLOGIST

## 2020-10-09 NOTE — PROGRESS NOTES
PROGRESS NOTE    Data:  Nunu Falk is a 54 y.o. female who met with the undersigned for a scheduled telephone psychotherapy session from 11:10 - 11:55am.    The pt consented to a telephone visit.      Clinical Maneuvering/Intervention:      The pt talked about recent stressors, mainly to do with difficult relationships in her life and the struggles to heal from past trauma. Stressors were processed individually and in detail. Venting of frustrations was conducted in order to help the pt feel less tense emotionally and gain insight into issues. Feelings were processed and validated several times in session. She was assisted with noticing, depending the person, what she is doing to facilitate connection/healthy relationship versus what she could stop doing in order to improve the relationship. She talked about her mother today and having difficulty at times to connect with her. The pt admits to often feeling hurt by what her mother says. Alternatives to respond to her mother were discussed. Role playing was conducted in order to help the pt gain insight into how to improve her communication skills, decrease tension with the other person, and notice things the pt may be doing (and needs to stop doing) in order to improve the quality of the relationship. Education about 'reading between the lines,' and in fact noticing that her mother often is expressing love to the pt, even if at first it does not seem like this. Some humor in her relationships was used as the pt can be quite witty and does well using humor to cope with stress. Finding value in her relationship was S was possible today, one of the very few times this happens and the pt was assisted with finding sherry (even humor) in this. The pt was assisted in recognizing progress in order to show encouragement and promote motivation to keep making positive changes in life. She is more in control of her anger and communicating better with others. She is being more  assertive over time as well and finding the value in this. The pt expressed gratitude for today's session.    Mental Status Exam  Hygiene:  good  Dress: appropriate  Attitude:  cooperative  Motor Activity: normal  Speech: normal  Mood: calmer than in sessions a couple of years ago  Affect:  congruent  Thought Processes: normal  Thought Content:  normal  Suicidal Thoughts:  not endorsed  Homicidal Thoughts:  not endorsed  Crisis Safety Plan: not needed   Hallucinations:  none      Patient's Support Network Includes:  family, friends      Progress toward goal: there is evidence to suggest that she is learning to cope better with distress and better at managing anger over time      Functional Status: moderate      Prognosis: good     Assessment      The pt presented with bipolar affect disorder, mixed type. She is often easily hurt and/or easily angered, but less so over time. Improving communication seems to be one beneficial way to help the pt connect better with others and in turn, experience better management of moods.     Plan      In order to prevent distress such as mood swings and irritability, the pt is to focus on continuing to practice communicating lovingly and assertively with family members and S (ongoing).  She is to give herself credit for her progress (this week, longer as needed).    Renetta Allen, PhD, LP

## 2020-11-06 ENCOUNTER — OFFICE VISIT (OUTPATIENT)
Dept: PSYCHIATRY | Facility: CLINIC | Age: 55
End: 2020-11-06

## 2020-11-06 DIAGNOSIS — F31.60 BIPOLAR AFFECTIVE DISORDER, MIXED (HCC): Primary | ICD-10-CM

## 2020-11-06 DIAGNOSIS — Z63.8 FAMILY CONFLICT: ICD-10-CM

## 2020-11-06 PROCEDURE — 90834 PSYTX W PT 45 MINUTES: CPT | Performed by: PSYCHOLOGIST

## 2020-11-06 SDOH — SOCIAL STABILITY - SOCIAL INSECURITY: OTHER SPECIFIED PROBLEMS RELATED TO PRIMARY SUPPORT GROUP: Z63.8
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
## 2020-12-04 ENCOUNTER — OFFICE VISIT (OUTPATIENT)
Dept: PSYCHIATRY | Facility: CLINIC | Age: 55
End: 2020-12-04

## 2020-12-04 DIAGNOSIS — F31.60 BIPOLAR AFFECTIVE DISORDER, MIXED (HCC): Primary | ICD-10-CM

## 2020-12-04 PROCEDURE — 90834 PSYTX W PT 45 MINUTES: CPT | Performed by: PSYCHOLOGIST

## 2020-12-05 NOTE — PROGRESS NOTES
PROGRESS NOTE    Data:  Nunu Falk is a 55 y.o. female who met with the undersigned for a scheduled telephone psychotherapy session from 11:07 - 11:55am.    The pt consented to a telephone visit.      Clinical Maneuvering/Intervention:      The pt talked about recent stressors, mainly related to relationships with others in life, but was a bit more positive today. Stressors were processed individually and in detail first, however. Venting of frustrations was conducted in order to help the pt feel less tense emotionally and gain insight into issues. Feelings were processed and validated several times in session. What is working in the pt's life in terms of what is helping improve mood and/or promote mental health was discussed in detail. The pt was assisted in recognizing progress in order to show encouragement and promote motivation to keep making positive changes in life. She is more in control of herself and her moods. She is communicating better with loved ones, slower to anger, and more assertive. Perspective taking was conducted multiple times in order to help her feel less stuck, less overwhelmed, and see challenges as much more manageable. She was assisted with seeing how she is likely, in fact, a naturally upbeat/fun person. Strengths were reviewed in order to bolster recent progress. The pt's strengths were identified in order to help identify abilities to use to better face/overcome challenges. She is strong, assertive, loving, fun, etc. The pt was commended for her progress. The pt expressed gratitude for today's session.    Mental Status Exam  Hygiene:  good  Dress: appropriate  Attitude:  cooperative  Motor Activity: normal  Speech: normal  Mood: calmer than in sessions a couple of years ago  Affect:  congruent  Thought Processes: normal  Thought Content:  normal  Suicidal Thoughts:  not endorsed  Homicidal Thoughts:  not endorsed  Crisis Safety Plan: not needed   Hallucinations:  none      Patient's  Support Network Includes:  family, friends      Progress toward goal: there is evidence to suggest that she is learning to cope better with distress and better at managing anger over time      Functional Status: moderate      Prognosis: good     Assessment      The pt presented with bipolar affect disorder, mixed type. She is often easily hurt and/or easily angered, but less so over time. Improving communication seems to be one beneficial way to help the pt connect better with others and in turn, experience better management of moods.     Plan      In order to prevent distress such as mood swings and irritability, the pt is to focus on continuing to practice communicating lovingly and assertively with family members and S (ongoing).  She is to give herself credit for her progress in managing her mood, particularly anger (today).     Renetta Allen, PhD, LP

## 2021-03-05 ENCOUNTER — OFFICE VISIT (OUTPATIENT)
Dept: PSYCHIATRY | Facility: CLINIC | Age: 56
End: 2021-03-05

## 2021-03-05 DIAGNOSIS — Z63.8 FAMILY CONFLICT: ICD-10-CM

## 2021-03-05 DIAGNOSIS — F31.60 BIPOLAR AFFECTIVE DISORDER, MIXED (HCC): Primary | ICD-10-CM

## 2021-03-05 DIAGNOSIS — Z62.819 H/O ABUSE IN CHILDHOOD: ICD-10-CM

## 2021-03-05 DIAGNOSIS — R45.86 MOOD SWINGS: ICD-10-CM

## 2021-03-05 PROCEDURE — 90834 PSYTX W PT 45 MINUTES: CPT | Performed by: PSYCHOLOGIST

## 2021-03-05 SDOH — SOCIAL STABILITY - SOCIAL INSECURITY: OTHER SPECIFIED PROBLEMS RELATED TO PRIMARY SUPPORT GROUP: Z63.8

## 2021-03-05 NOTE — PROGRESS NOTES
"PROGRESS NOTE    Data:  Nunu Falk is a 55 y.o. female who met with the undersigned for a scheduled telephone psychotherapy session from 11:10 - 11:58am.     The pt consented to a telephone visit.      Clinical Maneuvering/Intervention:      The pt talked about recent stressors such as relationships with her mother, her live-in-ex (S), her daughter, and her son. She talked about having a panic attack recently, a meltdown, and crying spells. She was open and talkative about how she feels on a deep level. The pt talked about not feeling loved, and often feeling hurt by others' actions or words. Previous traumas in life came up in session some as well. Stressors were processed individually and in detail. Venting of frustrations was conducted in order to help the pt feel less tense emotionally and gain insight into issues. Feelings were processed and validated several times in session. Perspective taking was conducted multiple times in order to help her feel less stuck, less overwhelmed, and see challenges as much more manageable. She talked about how others should know better or talk to her better (e.g., more loving or just nicer). Maladaptive thought patterns were identified, challenged, and evaluated for validity in order to allow for the pt to chose different and more adaptive ways of thinking. She was assisted with noting what she is really needing and not getting, such as feeling understood. More specifically, she is looking for validating statements that others may not naturally do. She was assisted with learning how to better get needs met, such as explaining to loved ones specifically what helps her feel better (validating statements such as, \"That's understandable\" or \"I could see why you feel that way,\" etc). Role playing was conducted in order to help the pt gain insight into how to improve her communication skills, decrease tension with the other person, and notice things the pt may be doing (and needs to " stop doing) in order to improve the quality of the relationship. The pt expressed gratitude for today's session.    Mental Status Exam  Hygiene:  good  Dress: appropriate  Attitude:  cooperative  Motor Activity: normal  Speech: normal  Mood: hurt   Affect:  congruent  Thought Processes: normal  Thought Content:  normal  Suicidal Thoughts:  not endorsed  Homicidal Thoughts:  not endorsed  Crisis Safety Plan: not needed   Hallucinations:  none      Patient's Support Network Includes:  family, friends      Progress toward goal: there is evidence to suggest that she is learning to cope better with distress and better at managing anger over time      Functional Status: moderate      Prognosis: good     Assessment      The pt presented with bipolar affect disorder, mixed type. She is often easily hurt and/or easily angered, but less so over time. Improving communication seems to be one beneficial way to help the pt connect better with others and in turn, experience better management of moods.     Plan      In order to prevent distress such as mood swings and irritability, the pt is to focus on practicing the specific communication skills training she learned today in session (ongoing). She is to do this with loved ones, starting with her mother, so they can have a healthier/closer relationship (as soon as feasible). The pt will give thought and give herself credit as well, for progress made thus far as discussed today and in previous sessions (this week).    Renetta Allen, PhD, LP

## 2021-06-25 ENCOUNTER — OFFICE VISIT (OUTPATIENT)
Dept: PSYCHIATRY | Facility: CLINIC | Age: 56
End: 2021-06-25

## 2021-06-25 DIAGNOSIS — F31.60 BIPOLAR AFFECTIVE DISORDER, MIXED (HCC): Primary | ICD-10-CM

## 2021-06-25 DIAGNOSIS — Z63.8 FAMILY CONFLICT: ICD-10-CM

## 2021-06-25 DIAGNOSIS — R45.86 MOOD SWINGS: ICD-10-CM

## 2021-06-25 DIAGNOSIS — R45.4 ANGER: ICD-10-CM

## 2021-06-25 PROCEDURE — 90834 PSYTX W PT 45 MINUTES: CPT | Performed by: PSYCHOLOGIST

## 2021-06-25 SDOH — SOCIAL STABILITY - SOCIAL INSECURITY: OTHER SPECIFIED PROBLEMS RELATED TO PRIMARY SUPPORT GROUP: Z63.8

## 2021-06-25 NOTE — PROGRESS NOTES
PROGRESS NOTE    Data:  Nunu Falk is a 55 y.o. female who met with the undersigned for a scheduled telephone psychotherapy session from 10:16 - 11:05am.     She is unable to connect via Zoom and is often very limited in funds to pay for things like gas to drive to this hospital. The pt consented to a telephone visit.       Clinical Maneuvering/Intervention:      The pt talked about recent stressors such as relationships with her mother, her live-in-ex (S), her daughter, and her son. She talked about the pain involved in having her son, DAX, going into the [employer] (moving away). She was tearful discussing this. She also talked about the many disagreements she has been having with others, particularly with her mother. Stressors were processed individually and in detail. Venting of frustrations was conducted in order to help the pt feel less tense emotionally and gain insight into issues. Feelings were processed and validated several times in session. The pt expressed deep hurt by the words of her mother. A main intervention was using insight built in the past related to understanding her mother. She was assisted with seeing her mother as someone who does love her, has a history that makes expression difficult, and that some of the things her mother does are not to be insults (just her mother's effort to communicate, connect, etc). Belief was expressed to help the pt see how she can continue to love her mother with less stress involved. Some humor was used in session as it is one of the pt's coping skills. Humor was used too, to help the pt see things as less challenging and more manageable than they first seemed. Humor was used as well, to model use of the skill as a way to decrease tension ongoing.  The pt was assisted in seeing that even some of the things that may frustrate the pt (mother's action) are not only non-threatening, but even endearing. The pt expressed gratitude for the help today and was able to give  examples of successes in her life where she did not snap back or get mean with a loved one, but either stepped away or stayed quiet or both. She was commended for her growth (controlling anger and becoming more assertive/less aggressive). The pt expressed gratitude for today's session.    Mental Status Exam  Hygiene:  good  Dress: appropriate  Attitude:  cooperative  Motor Activity: normal  Speech: normal  Mood: hurt and upset  Affect:  congruent  Thought Processes: normal  Thought Content:  normal  Suicidal Thoughts:  not endorsed  Homicidal Thoughts:  not endorsed  Crisis Safety Plan: not needed   Hallucinations:  none      Patient's Support Network Includes:  family, friends      Progress toward goal: there is evidence to suggest that she is learning to cope better with distress and better at managing anger over time      Functional Status: moderate      Prognosis: good     Assessment      The pt presented with bipolar affect disorder, mixed type. She is hurting by her son moving away and for not being able to connect better to loved ones, though she is improving in connecting with others more effectively over time. Improving communication seems to be one beneficial way to help the pt connect better with others and in turn, experience better management of moods.     Plan      In order to prevent distress such as mood swings and irritability, the pt is to focus on practicing the specific communication skills training she learned today in session, building on insight discussed in previous sessions (ongoing). She is to do this with loved ones, starting with her mother, so they can have a healthier/closer relationship (this week). The pt will give thought and give herself credit as well, for her improvement in managing her mood/anger (this week).    Renetta Allen, PhD, LP

## 2025-02-18 ENCOUNTER — APPOINTMENT (OUTPATIENT)
Dept: GENERAL RADIOLOGY | Facility: HOSPITAL | Age: 60
End: 2025-02-18
Payer: MEDICARE

## 2025-02-18 ENCOUNTER — HOSPITAL ENCOUNTER (EMERGENCY)
Facility: HOSPITAL | Age: 60
Discharge: HOME | End: 2025-02-18
Attending: EMERGENCY MEDICINE | Admitting: EMERGENCY MEDICINE
Payer: MEDICARE

## 2025-02-18 ENCOUNTER — APPOINTMENT (OUTPATIENT)
Dept: CT IMAGING | Facility: HOSPITAL | Age: 60
End: 2025-02-18
Payer: MEDICARE

## 2025-02-18 VITALS
HEIGHT: 65 IN | RESPIRATION RATE: 16 BRPM | SYSTOLIC BLOOD PRESSURE: 127 MMHG | BODY MASS INDEX: 29.99 KG/M2 | OXYGEN SATURATION: 91 % | WEIGHT: 180 LBS | DIASTOLIC BLOOD PRESSURE: 59 MMHG | HEART RATE: 62 BPM | TEMPERATURE: 99.1 F

## 2025-02-18 DIAGNOSIS — M54.6 ACUTE LEFT-SIDED THORACIC BACK PAIN: Primary | ICD-10-CM

## 2025-02-18 DIAGNOSIS — R07.9 CHEST PAIN, UNSPECIFIED TYPE: ICD-10-CM

## 2025-02-18 LAB
ALBUMIN SERPL-MCNC: 4.3 G/DL (ref 3.5–5.2)
ALBUMIN/GLOB SERPL: 1.3 G/DL
ALP SERPL-CCNC: 49 U/L (ref 39–117)
ALT SERPL W P-5'-P-CCNC: 17 U/L (ref 1–33)
ANION GAP SERPL CALCULATED.3IONS-SCNC: 12 MMOL/L (ref 5–15)
AST SERPL-CCNC: 18 U/L (ref 1–32)
BASOPHILS # BLD AUTO: 0.04 10*3/MM3 (ref 0–0.2)
BASOPHILS NFR BLD AUTO: 0.4 % (ref 0–1.5)
BILIRUB SERPL-MCNC: 0.4 MG/DL (ref 0–1.2)
BUN SERPL-MCNC: 19 MG/DL (ref 6–20)
BUN/CREAT SERPL: 18.3 (ref 7–25)
CALCIUM SPEC-SCNC: 9.7 MG/DL (ref 8.6–10.5)
CHLORIDE SERPL-SCNC: 105 MMOL/L (ref 98–107)
CO2 SERPL-SCNC: 27 MMOL/L (ref 22–29)
CREAT SERPL-MCNC: 1.04 MG/DL (ref 0.57–1)
DEPRECATED RDW RBC AUTO: 43.1 FL (ref 37–54)
EGFRCR SERPLBLD CKD-EPI 2021: 62.8 ML/MIN/1.73
EOSINOPHIL # BLD AUTO: 0.33 10*3/MM3 (ref 0–0.4)
EOSINOPHIL NFR BLD AUTO: 3.7 % (ref 0.3–6.2)
ERYTHROCYTE [DISTWIDTH] IN BLOOD BY AUTOMATED COUNT: 14 % (ref 12.3–15.4)
FLUAV RNA RESP QL NAA+PROBE: NOT DETECTED
FLUBV RNA RESP QL NAA+PROBE: NOT DETECTED
GEN 5 1HR TROPONIN T REFLEX: 12 NG/L
GLOBULIN UR ELPH-MCNC: 3.3 GM/DL
GLUCOSE SERPL-MCNC: 89 MG/DL (ref 65–99)
HCT VFR BLD AUTO: 37.3 % (ref 34–46.6)
HGB BLD-MCNC: 12.6 G/DL (ref 12–15.9)
IMM GRANULOCYTES # BLD AUTO: 0.03 10*3/MM3 (ref 0–0.05)
IMM GRANULOCYTES NFR BLD AUTO: 0.3 % (ref 0–0.5)
LYMPHOCYTES # BLD AUTO: 1.37 10*3/MM3 (ref 0.7–3.1)
LYMPHOCYTES NFR BLD AUTO: 15.4 % (ref 19.6–45.3)
MCH RBC QN AUTO: 28.8 PG (ref 26.6–33)
MCHC RBC AUTO-ENTMCNC: 33.8 G/DL (ref 31.5–35.7)
MCV RBC AUTO: 85.4 FL (ref 79–97)
MONOCYTES # BLD AUTO: 0.37 10*3/MM3 (ref 0.1–0.9)
MONOCYTES NFR BLD AUTO: 4.2 % (ref 5–12)
NEUTROPHILS NFR BLD AUTO: 6.75 10*3/MM3 (ref 1.7–7)
NEUTROPHILS NFR BLD AUTO: 76 % (ref 42.7–76)
NRBC BLD AUTO-RTO: 0 /100 WBC (ref 0–0.2)
PLATELET # BLD AUTO: 257 10*3/MM3 (ref 140–450)
PMV BLD AUTO: 10.2 FL (ref 6–12)
POTASSIUM SERPL-SCNC: 3.8 MMOL/L (ref 3.5–5.2)
PROT SERPL-MCNC: 7.6 G/DL (ref 6–8.5)
RBC # BLD AUTO: 4.37 10*6/MM3 (ref 3.77–5.28)
SARS-COV-2 RNA RESP QL NAA+PROBE: NOT DETECTED
SODIUM SERPL-SCNC: 144 MMOL/L (ref 136–145)
TROPONIN T NUMERIC DELTA: 2 NG/L
TROPONIN T SERPL HS-MCNC: 10 NG/L
WBC NRBC COR # BLD AUTO: 8.89 10*3/MM3 (ref 3.4–10.8)

## 2025-02-18 PROCEDURE — 85025 COMPLETE CBC W/AUTO DIFF WBC: CPT | Performed by: EMERGENCY MEDICINE

## 2025-02-18 PROCEDURE — 71250 CT THORAX DX C-: CPT

## 2025-02-18 PROCEDURE — 80053 COMPREHEN METABOLIC PANEL: CPT | Performed by: EMERGENCY MEDICINE

## 2025-02-18 PROCEDURE — 36415 COLL VENOUS BLD VENIPUNCTURE: CPT

## 2025-02-18 PROCEDURE — 87636 SARSCOV2 & INF A&B AMP PRB: CPT | Performed by: EMERGENCY MEDICINE

## 2025-02-18 PROCEDURE — 93005 ELECTROCARDIOGRAM TRACING: CPT

## 2025-02-18 PROCEDURE — 84484 ASSAY OF TROPONIN QUANT: CPT | Performed by: EMERGENCY MEDICINE

## 2025-02-18 PROCEDURE — 93005 ELECTROCARDIOGRAM TRACING: CPT | Performed by: EMERGENCY MEDICINE

## 2025-02-18 PROCEDURE — 71046 X-RAY EXAM CHEST 2 VIEWS: CPT

## 2025-02-18 PROCEDURE — 99284 EMERGENCY DEPT VISIT MOD MDM: CPT

## 2025-02-18 RX ORDER — ATORVASTATIN CALCIUM 20 MG/1
1 TABLET, FILM COATED ORAL DAILY
COMMUNITY
Start: 2024-11-22

## 2025-02-18 RX ORDER — PANTOPRAZOLE SODIUM 40 MG/1
1 TABLET, DELAYED RELEASE ORAL DAILY
COMMUNITY
Start: 2025-01-22

## 2025-02-18 RX ORDER — ALBUTEROL SULFATE 90 UG/1
2 INHALANT RESPIRATORY (INHALATION) EVERY 6 HOURS PRN
COMMUNITY
Start: 2025-02-06

## 2025-02-18 RX ORDER — HYDROXYZINE HYDROCHLORIDE 25 MG/1
25 TABLET, FILM COATED ORAL EVERY 8 HOURS PRN
COMMUNITY
Start: 2024-11-25

## 2025-02-18 RX ORDER — FLUTICASONE FUROATE AND VILANTEROL TRIFENATATE 100; 25 UG/1; UG/1
1 POWDER RESPIRATORY (INHALATION)
COMMUNITY
Start: 2024-12-12

## 2025-02-18 RX ORDER — DAPAGLIFLOZIN 10 MG/1
1 TABLET, FILM COATED ORAL DAILY
COMMUNITY
Start: 2025-01-28

## 2025-02-18 RX ORDER — TRAZODONE HYDROCHLORIDE 50 MG/1
1 TABLET, FILM COATED ORAL DAILY
COMMUNITY
Start: 2025-01-23

## 2025-02-18 RX ORDER — SERTRALINE HYDROCHLORIDE 25 MG/1
25 TABLET, FILM COATED ORAL DAILY
COMMUNITY
Start: 2024-12-26

## 2025-02-18 RX ORDER — POTASSIUM CHLORIDE 1.5 G/1.58G
20 POWDER, FOR SOLUTION ORAL AS NEEDED
COMMUNITY
Start: 2025-02-15

## 2025-02-18 RX ORDER — SODIUM CHLORIDE 0.9 % (FLUSH) 0.9 %
10 SYRINGE (ML) INJECTION AS NEEDED
Status: DISCONTINUED | OUTPATIENT
Start: 2025-02-18 | End: 2025-02-18 | Stop reason: HOSPADM

## 2025-02-18 RX ORDER — LISINOPRIL 5 MG/1
1 TABLET ORAL DAILY
COMMUNITY
Start: 2025-02-11

## 2025-02-18 RX ORDER — OXYCODONE AND ACETAMINOPHEN 5; 325 MG/1; MG/1
1 TABLET ORAL ONCE
Status: COMPLETED | OUTPATIENT
Start: 2025-02-18 | End: 2025-02-18

## 2025-02-18 RX ORDER — NAPROXEN 500 MG/1
500 TABLET ORAL 2 TIMES DAILY WITH MEALS
Qty: 20 TABLET | Refills: 0 | Status: SHIPPED | OUTPATIENT
Start: 2025-02-18

## 2025-02-18 RX ORDER — IBUPROFEN 800 MG/1
1 TABLET, FILM COATED ORAL 3 TIMES DAILY
COMMUNITY
Start: 2025-02-06

## 2025-02-18 RX ADMIN — OXYCODONE HYDROCHLORIDE AND ACETAMINOPHEN 1 TABLET: 5; 325 TABLET ORAL at 11:17

## 2025-02-18 NOTE — ED PROVIDER NOTES
[facility]    EMERGENCY DEPARTMENT ENCOUNTER      Pt Name: Nunu Falk  MRN: [id number]  YOB: 1965  Date of evaluation: 2025  Provider: Antonio Shay DO    CHIEF COMPLAINT       Chief Complaint   Patient presents with    Back Pain     HPI  Stated Reason for Visit: pt presents today for back pain that radiates up her L side. pt went to [facility] but was only given meds at hospital. History Obtained From: patient;EMS     HISTORY OF PRESENT ILLNESS  (Location/Symptom, Timing/Onset, Context/Setting, Quality, Duration, Modifying Factors, Severity.)   Nunu Falk is a 59 y.o. female who presents to the emergency department via EMS for evaluation of left upper lateral back pain around the left scapular region which been waxing waning over the last 1 week.  No known fall, injury or trauma.  She was seen in outside hospital, had x-ray of the chest and rib series completed with no acute abnormality.  She was given some symptomatic treatments, continues with pain which prompted her assessment today.  She does have issues with recurrent back injuries, is had cough and congestion, concern that she may have pneumonia which she notes did not  on prior x-ray images.  She denies any fever or chills, has had some mild cough and congestion.  She denies any mid or lower back pain, no saddle anesthesia, she has no other acute systemic complaints at this time.    Nursing notes were reviewed.      PAST MEDICAL HISTORY     Past Medical History:   Diagnosis Date    Anxiety     Depression     Diabetes mellitus     Hypertension          SURGICAL HISTORY       Past Surgical History:   Procedure Laterality Date    ABDOMINAL SURGERY      APPENDECTOMY      BREAST SURGERY       SECTION      CHOLECYSTECTOMY      DENTAL PROCEDURE      HYSTERECTOMY           CURRENT MEDICATIONS       Current Facility-Administered Medications:     [COMPLETED] Insert Peripheral IV, , , Once **AND** sodium chloride 0.9  % flush 10 mL, 10 mL, Intravenous, PRN, Antonio Shay,     Current Outpatient Medications:     albuterol sulfate  (90 Base) MCG/ACT inhaler, Inhale 2 puffs Every 6 (Six) Hours As Needed., Disp: , Rfl:     atenolol 100 MG tablet 100 mg, chlorthalidone 25 MG tablet 25 mg per tablet, Take 1 tablet by mouth Daily., Disp: , Rfl:     atorvastatin (LIPITOR) 20 MG tablet, Take 1 tablet by mouth Daily., Disp: , Rfl:     Breo Ellipta 100-25 MCG/ACT aerosol powder , Inhale 1 puff Daily., Disp: , Rfl:     Farxiga 10 MG tablet, Take 10 mg by mouth Daily., Disp: , Rfl:     hydrOXYzine (ATARAX) 25 MG tablet, Take 1 tablet by mouth Every 8 (Eight) Hours As Needed for Anxiety., Disp: , Rfl:     ibuprofen (ADVIL,MOTRIN) 800 MG tablet, Take 1 tablet by mouth 3 times a day., Disp: , Rfl:     lisinopril (PRINIVIL,ZESTRIL) 5 MG tablet, Take 1 tablet by mouth Daily., Disp: , Rfl:     metFORMIN (GLUCOPHAGE) 1000 MG tablet, Take 1 tablet by mouth Every 12 (Twelve) Hours., Disp: , Rfl:     pantoprazole (PROTONIX) 40 MG EC tablet, Take 1 tablet by mouth Daily., Disp: , Rfl:     potassium chloride (KLOR-CON) 20 MEQ packet, Take 20 mEq by mouth As Needed., Disp: , Rfl:     sertraline (ZOLOFT) 25 MG tablet, Take 1 tablet by mouth Daily., Disp: , Rfl:     sertraline (ZOLOFT) 50 MG tablet, Take 1 tablet by mouth Daily., Disp: , Rfl:     traZODone (DESYREL) 50 MG tablet, Take 1 tablet by mouth Daily., Disp: , Rfl:     cyclobenzaprine (FLEXERIL) 10 MG tablet, Take 1 tablet by mouth 3 (Three) Times a Day As Needed (Back pain)., Disp: 21 tablet, Rfl: 0    naproxen (Naprosyn) 500 MG tablet, Take 1 tablet by mouth 2 (Two) Times a Day With Meals., Disp: 20 tablet, Rfl: 0    ALLERGIES     Prednisone, Prozac [fluoxetine hcl], and Buspar [buspirone]    FAMILY HISTORY     History reviewed. No pertinent family history.       SOCIAL HISTORY       Social History     Socioeconomic History    Marital status:    Tobacco Use    Smoking  status: Former     Types: Cigarettes   Vaping Use    Vaping status: Every Day   Substance and Sexual Activity    Alcohol use: No    Drug use: Not Currently     Types: Marijuana         PHYSICAL EXAM       Vitals:    02/18/25 1031 02/18/25 1033 02/18/25 1100 02/18/25 1200   BP: 102/64  109/61 132/68   BP Location:       Patient Position:       Pulse:  64 73 60   Resp:       Temp:       TempSrc:       SpO2:  96% 92% 93%   Weight:       Height:           Physical Exam  General : Patient is awake, alert, oriented, in no acute distress, nontoxic appearing  HEENT: Pupils are equally round, EOMI, conjunctivae clear  Neck: Neck is supple, full range of motion, trachea midline  Cardiac: Heart regular rate, rhythm, no murmurs, rubs, or gallops  Lungs: Lungs are clear to auscultation, there is no wheezing, rhonchi, or rales. There is no use of accessory muscles  Chest wall: There is no tenderness to palpation over the chest wall or over ribs  Abdomen: Abdomen is soft, nontender, nondistended. There are no firm or pulsatile masses, no rebound rigidity or guarding  Musculoskeletal: There is reproducible tenderness palpation of the left paraspinal thoracic musculature which radiates around to the left scapular region, there is no obvious deformity, no flail chest, she has bilateral and equal breath sounds.  This is reproducible with range of motion of the left shoulder.  5 out of 5 strength in all 4 extremities.  No focal muscle deficits are appreciated  Neuro: Motor intact, sensory intact, level of consciousness is normal, cerebellar function is normal, reflexes are grossly normal. No evidence of incontinence or loss of bowel or bladder function, no saddle anesthesia noted   Dermatology: Skin is warm and dry  Psych: Mentation is grossly normal, cognition is grossly normal. Affect is appropriate      DIAGNOSTIC RESULTS     EKG:  All EKGs are interpreted by the Emergency Department Physician who either signs or Co-signs this chart  in the absence of a cardiologist.    ECG 12 Lead Other; upper back pain   Preliminary Result   Test Reason : Other~   Blood Pressure :   */*   mmHG   Vent. Rate :  64 BPM     Atrial Rate :  64 BPM      P-R Int : 142 ms          QRS Dur :  80 ms       QT Int : 430 ms       P-R-T Axes :  21  10  37 degrees     QTcB Int : 443 ms      Normal sinus rhythm   Normal ECG   When compared with ECG of 04-Jan-2015 10:33,   No significant change was found      Referred By: RHINA           Confirmed By:       ECG 12 Lead Chest Pain    (Results Pending)       RADIOLOGY:     [x] Radiologist's Report Reviewed:  CT Chest Without Contrast Diagnostic   Final Result   Impression:   No acute intrathoracic pathology.            Electronically Signed: Jas Anand MD     2/18/2025 1:49 PM EST     Workstation ID: [id number]      XR Chest 2 View   Final Result   Impression:   No acute process identified         Electronically Signed: Bharath Mcleod MD     2/18/2025 10:02 AM EST     Workstation ID: [id number]          I ordered and independently reviewed the above noted radiographic studies.      I viewed images of CT chest which showed no acute cardiopulmonary process, no rib fracture, no signs of underlying pneumonia per my independent interpretation.    See radiologist's dictation for official interpretation.        LABS:    I have reviewed and interpreted all of the currently available lab results from this visit (if applicable):  Results for orders placed or performed during the hospital encounter of 02/18/25   COVID-19 and FLU A/B PCR, 1 HR TAT - Swab, Nasopharynx    Collection Time: 02/18/25  9:44 AM    Specimen: Nasopharynx; Swab   Result Value Ref Range    COVID19 Not Detected Not Detected - Ref. Range    Influenza A PCR Not Detected Not Detected    Influenza B PCR Not Detected Not Detected   ECG 12 Lead Other; upper back pain    Collection Time: 02/18/25  9:53 AM   Result Value Ref Range    QT Interval 430 ms    QTC Interval 443 ms    Comprehensive Metabolic Panel    Collection Time: 02/18/25 11:16 AM    Specimen: Blood   Result Value Ref Range    Glucose 89 65 - 99 mg/dL    BUN 19 6 - 20 mg/dL    Creatinine 1.04 (H) 0.57 - 1.00 mg/dL    Sodium 144 136 - 145 mmol/L    Potassium 3.8 3.5 - 5.2 mmol/L    Chloride 105 98 - 107 mmol/L    CO2 27.0 22.0 - 29.0 mmol/L    Calcium 9.7 8.6 - 10.5 mg/dL    Total Protein 7.6 6.0 - 8.5 g/dL    Albumin 4.3 3.5 - 5.2 g/dL    ALT (SGPT) 17 1 - 33 U/L    AST (SGOT) 18 1 - 32 U/L    Alkaline Phosphatase 49 39 - 117 U/L    Total Bilirubin 0.4 0.0 - 1.2 mg/dL    Globulin 3.3 gm/dL    A/G Ratio 1.3 g/dL    BUN/Creatinine Ratio 18.3 7.0 - 25.0    Anion Gap 12.0 5.0 - 15.0 mmol/L    eGFR 62.8 >60.0 mL/min/1.73   High Sensitivity Troponin T    Collection Time: 02/18/25 11:16 AM    Specimen: Blood   Result Value Ref Range    HS Troponin T 10 <14 ng/L   CBC Auto Differential    Collection Time: 02/18/25 11:16 AM    Specimen: Blood   Result Value Ref Range    WBC 8.89 3.40 - 10.80 10*3/mm3    RBC 4.37 3.77 - 5.28 10*6/mm3    Hemoglobin 12.6 12.0 - 15.9 g/dL    Hematocrit 37.3 34.0 - 46.6 %    MCV 85.4 79.0 - 97.0 fL    MCH 28.8 26.6 - 33.0 pg    MCHC 33.8 31.5 - 35.7 g/dL    RDW 14.0 12.3 - 15.4 %    RDW-SD 43.1 37.0 - 54.0 fl    MPV 10.2 6.0 - 12.0 fL    Platelets 257 140 - 450 10*3/mm3    Neutrophil % 76.0 42.7 - 76.0 %    Lymphocyte % 15.4 (L) 19.6 - 45.3 %    Monocyte % 4.2 (L) 5.0 - 12.0 %    Eosinophil % 3.7 0.3 - 6.2 %    Basophil % 0.4 0.0 - 1.5 %    Immature Grans % 0.3 0.0 - 0.5 %    Neutrophils, Absolute 6.75 1.70 - 7.00 10*3/mm3    Lymphocytes, Absolute 1.37 0.70 - 3.10 10*3/mm3    Monocytes, Absolute 0.37 0.10 - 0.90 10*3/mm3    Eosinophils, Absolute 0.33 0.00 - 0.40 10*3/mm3    Basophils, Absolute 0.04 0.00 - 0.20 10*3/mm3    Immature Grans, Absolute 0.03 0.00 - 0.05 10*3/mm3    nRBC 0.0 0.0 - 0.2 /100 WBC   High Sensitivity Troponin T 1Hr    Collection Time: 02/18/25 12:35 PM    Specimen: Blood   Result  Value Ref Range    HS Troponin T 12 <14 ng/L    Troponin T Numeric Delta 2 Abnormal if >/=3 ng/L        If labs were ordered, I independently reviewed the results and considered them in treating the patient.      EMERGENCY DEPARTMENT COURSE and DIFFERENTIAL DIAGNOSIS/MDM:   Vitals:  AS OF 15:36 EST    BP - 132/68  HR - 60  TEMP - 99.1 °F (37.3 °C) (Oral)  O2 SATS - 93%      Orders placed during this visit:  Orders Placed This Encounter   Procedures    COVID PRE-OP / PRE-PROCEDURE SCREENING ORDER (NO ISOLATION) - Swab, Nasopharynx    COVID-19 and FLU A/B PCR, 1 HR TAT - Swab, Nasopharynx    XR Chest 2 View    CT Chest Without Contrast Diagnostic    Comprehensive Metabolic Panel    High Sensitivity Troponin T    CBC Auto Differential    High Sensitivity Troponin T 1Hr    Ambulatory Referral to [facility]    ECG 12 Lead Other; upper back pain    ECG 12 Lead Chest Pain    Insert Peripheral IV    CBC & Differential       All labs have been independently reviewed by me.  All radiology studies have been reviewed by me and the radiologist dictating the report.  All EKG's have been independently viewed and interpreted by me.      Discussion below represents my analysis of pertinent findings related to patient's condition, differential diagnosis, treatment plan and final disposition.    Differential diagnosis:  The differential diagnosis associated with the patient's presentation includes: Rib injury, pleurisy, pneumonia, pleural effusion, MSK injury or strain    Additional sources  Discussed/ obtained information from independent historians:   [] Spouse  [] Parent  [] Family member  [] Friend  [x] EMS   [] Other:    External (non-ED) record review:   [] Inpatient record:   [] Office record:   [] Outpatient record:   [] Prior Outpatient labs:   [] Prior Outpatient radiology:   [] Primary Care record:   [] Outside ED record:   [] Other:     Patient's care impacted by:   [] Diabetes  [] Hypertension  []  CHF  [] Hyperlipidemia  [] Coronary Artery Disease   [] COPD   [] Cancer   [] Tobacco Abuse   [] Substance Abuse    [] Other:     Care significantly affected by Social Determinants of Health (housing and economic circumstances, unemployment)    [] Yes     [x] No   If yes, Patient's care significantly limited by Social Determinants of Health including:   [] Inadequate housing   [] Low income   [] Alcoholism and drug addiction in family   [] Problems related to primary support group   [] Unemployment   [] Problems related to employment   [] Other Social Determinants of Health:       MEDICATIONS ADMINISTERED IN ED:  Medications   sodium chloride 0.9 % flush 10 mL (has no administration in time range)   oxyCODONE-acetaminophen (PERCOCET) 5-325 MG per tablet 1 tablet (1 tablet Oral Given 2/18/25 1117)              This a 59-year-old female with left-sided upper back shoulder pain which is midline around to the left scapular region.  She is not acutely ill or toxic appearing, no is no midline tenderness, no lower back pain, she not have any signs of acute discussed or cauda equina syndrome initial assessment.  This appears to be an acute on chronic issue for the patient.  She is concerned about possible pneumonia as previously was picked up on CT scan and not x-ray.  Her x-ray is clear.  Patient's labs are unremarkable, white count H&H are normal, troponin is unremarkable x 2.  CT of the chest is also unremarkable for fracture, pneumonia etc.  I discussed with the patient likely an inflammatory process which we will have the patient treated symptomatically, anti-inflammatories, she has been updated on the current findings, the need for symptomatic treatment, will have her follow-up with PCP in a couple days for reevaluation.    I had a discussion with the patient/family regarding diagnosis, diagnostic results, treatment plan, and medications.  The patient/family indicated understanding of these instructions.  I spent  adequate time at the bedside preceding discharge necessary to personally discuss the aftercare instructions, giving patient education, providing explanations of the results of our evaluations/findings, and my decision making to assure that the patient/family understand the plan of care.  Time was allotted to answer questions at that time and throughout the ED course.  Emphasis was placed on timely follow-up after discharge.  I also discussed the potential for the development of an acute emergent condition requiring further evaluation, admission, or even surgical intervention. I discussed that we found nothing during the visit today indicating the need for further workup, admission, or the presence of an unstable medical condition.  I encouraged the patient to return to the emergency department immediately for ANY concerns, worsening, new complaints, or if symptoms persist and unable to seek follow-up in a timely fashion.  The patient/family expressed understanding and agreement with this plan.  The patient will follow-up with their PCP in 1-2 days for reevaluation.     PROCEDURES:  Procedures    CRITICAL CARE TIME    Total Critical Care time was 0 minutes, excluding separately reportable procedures.   There was a high probability of clinically significant/life threatening deterioration in the patient's condition which required my urgent intervention.      FINAL IMPRESSION      1. Acute left-sided thoracic back pain    2. Chest pain, unspecified type          DISPOSITION/PLAN     ED Disposition       ED Disposition   Discharge    Condition   Stable    Comment   --               PATIENT REFERRED TO:  [facility] CARDIOLOGY  [address]  [place] [address] [phone]  Schedule an appointment as soon as possible for a visit       Antonio Ramos MD  [address]  [place]  [facility] [address] [phone]    In 2 days      [facility] EMERGENCY  DEPARTMENT  [address] [address] [phone]    If symptoms worsen      DISCHARGE MEDICATIONS:     Medication List        START taking these medications      naproxen 500 MG tablet  Commonly known as: Naprosyn  Take 1 tablet by mouth 2 (Two) Times a Day With Meals.            CONTINUE taking these medications      albuterol sulfate  (90 Base) MCG/ACT inhaler  Commonly known as: PROVENTIL HFA;VENTOLIN HFA;PROAIR HFA     atenolol 100 MG tablet 100 mg, chlorthalidone 25 MG tablet 25 mg per tablet     atorvastatin 20 MG tablet  Commonly known as: LIPITOR     Breo Ellipta 100-25 MCG/ACT aerosol powder   Generic drug: Fluticasone Furoate-Vilanterol     cyclobenzaprine 10 MG tablet  Commonly known as: FLEXERIL  Take 1 tablet by mouth 3 (Three) Times a Day As Needed (Back pain).     Farxiga 10 MG tablet  Generic drug: dapagliflozin Propanediol     hydrOXYzine 25 MG tablet  Commonly known as: ATARAX     ibuprofen 800 MG tablet  Commonly known as: ADVIL,MOTRIN     lisinopril 5 MG tablet  Commonly known as: PRINIVIL,ZESTRIL     metFORMIN 1000 MG tablet  Commonly known as: GLUCOPHAGE     pantoprazole 40 MG EC tablet  Commonly known as: PROTONIX     potassium chloride 20 MEQ packet  Commonly known as: KLOR-CON     * sertraline 50 MG tablet  Commonly known as: ZOLOFT     * sertraline 25 MG tablet  Commonly known as: ZOLOFT     traZODone 50 MG tablet  Commonly known as: DESYREL           * This list has 2 medication(s) that are the same as other medications prescribed for you. Read the directions carefully, and ask your doctor or other care provider to review them with you.                   Where to Get Your Medications        These medications were sent to [facility]/pharmacy #[id number] - [place], [place] - [address] [place] [address] AT [facility] - [phone]  - [phone] [address] [address]      Hours: 24-hours Phone: [phone]   naproxen 500 MG tablet              Comment: Please note this report has been produced using speech recognition software.      Antonio Shay DO  Attending Emergency Physician         Antonio Shay,   02/18/25 1532

## 2025-02-23 LAB
QT INTERVAL: 430 MS
QTC INTERVAL: 443 MS

## 2025-02-24 NOTE — PROGRESS NOTES
Mode of visit: Video  Location of patient:[facility]   Location of provider: [facility]  You have chosen to receive care through a telehealth visit.   Do you consent to the use video/audio connection for your medical care today?: Yes  This visit included audio and video interaction. No technical issues occurred during the visit.       Chief Complaint  Chest Pain    [facility]  Heart and Valve Center  Telemedicine note    This was a video enabled telemedicine encounter.    Subjective    History of Present Illness {CC  Problem List  Visit  Diagnosis   Encounters  Notes  Medications  Labs  Result Review Imaging  Media :23}     Nunu Falk, 59 y.o. female with past medical history significant for anxiety, depression, diabetes, and hypertension, who presents to [facility] Heart and Valve telemedicine visit for Chest Pain  Patient presented to [facility] ED on 2/18/2025 with chief complaint of left upper back pain.  Twelve-lead EKG while in the ED showed normal sinus rhythm, rate 64, normal EKG.  CT scan of the chest without contrast showed no acute intrathoracic pathology.  There is mention of mild coronary artery calcifications.  High-sensitivity troponin x 2 noted to be unremarkable.  Patient was discharged from the ED with new prescription for naproxen 500 mg twice daily.  She was encouraged to follow-up with primary care, and heart and valve center.    At time of today's evaluation, patient denies additional episodes of chest pain or pressure.  During discussion of recent ED visit, patient denies ever having actual chest pain.  She states that her discomfort was only located in her back.  Patient currently denies any dyspnea on exertion, syncope, near syncope, lower extremity edema, or palpitations.  Patient does intermittently check her blood pressure at home which she states is overall well-controlled.    Activity level: Patient is active at work without routine  exercise  Caffeine intake: 3-4 diet cokes (20 ounces bottles) daily  Water intake: Inadequate   Nicotine use: Vaping  Family history: Unknown patient is adopted      Objective     Vital Signs:   Vitals:     There is no height or weight on file to calculate BMI.  Physical Exam  Vitals and nursing note reviewed.   Constitutional:       Appearance: Normal appearance.   HENT:      Head: Normocephalic.   Pulmonary:      Effort: Pulmonary effort is normal. No respiratory distress.   Musculoskeletal:         General: Normal range of motion.   Neurological:      Mental Status: She is alert and oriented to person, place, and time.   Psychiatric:         Mood and Affect: Mood normal.         Thought Content: Thought content normal.                Data Reviewed:{ Labs  Result Review  Imaging  Med Tab  Media :23}     Lab Results   Component Value Date    WBC 8.89 02/18/2025    HGB 12.6 02/18/2025    HCT 37.3 02/18/2025    MCV 85.4 02/18/2025     02/18/2025      Lab Results   Component Value Date    GLUCOSE 89 02/18/2025    BUN 19 02/18/2025    CREATININE 1.04 (H) 02/18/2025     02/18/2025    K 3.8 02/18/2025     02/18/2025    CALCIUM 9.7 02/18/2025    PROTEINTOT 7.6 02/18/2025    ALBUMIN 4.3 02/18/2025    ALT 17 02/18/2025    AST 18 02/18/2025    ALKPHOS 49 02/18/2025    BILITOT 0.4 02/18/2025    GLOB 3.3 02/18/2025    AGRATIO 1.3 02/18/2025    BCR 18.3 02/18/2025    ANIONGAP 12.0 02/18/2025    EGFR 62.8 02/18/2025      Lab Results   Component Value Date    TROPONINI <0.01 12/31/2014    TROPONINT 12 02/18/2025        CT Chest Without Contrast Diagnostic (02/18/2025 13:09)  XR Chest 2 View (02/18/2025 09:57)    Assessment and Plan {CC Problem List  Visit Diagnosis  ROS  Review (Popup)  Health Maintenance  Quality  BestPractice  Medications  SmartSets  SnapShot Encounters  Media :23}     This visit has been scheduled as a video visit.     1. Other chest pain  -Most recent ED evaluation reviewed  and discussed today with patient including EKG, chest CT, and laboratory results  -Patient with current atypical chest pain that is nonexertional in nature.  Due to lack of exertional symptoms, we will defer stress testing at this time  -Would like for patient to undergo echocardiogram to rule out structural or functional abnormalities which could be contributing to patient's symptoms  -Discussed with patient that CT scan mentions a finding of mild coronary calcifications  - Adult Transthoracic Echo Complete W/ Cont if Necessary Per Protocol; Future    2. Primary hypertension  -Blood pressure appears to be adequately controlled according to recent ED evaluation, and patient report  -Discussed with patient recommendation to begin daily ambulatory blood pressure monitoring.  Ideally, patient would check her blood pressure once per day 2 to 3 hours after taking morning medication, and after sitting and resting for 10 to 15 minutes  -Patient will document date, time, blood pressure, and pulse rate.  Patient is aware that her goal blood pressure is consistently less than 140/90  -During evaluation, patient mentions a prior echocardiogram mentioned left ventricular hypertrophy.  As mentioned above, we will plan to proceed with echocardiogram to rule out any worsening in hypertrophy, or other structural or functional abnormalities  -For now, would recommend patient continue current regimen of atenolol 100 mg daily, chlorthalidone 25 mg daily, lisinopril 5 mg daily    3.  Hyperlipidemia  -Lipid panel currently managed by primary care  -Recommend to continue atorvastatin 20 mg daily  -Will attempt to request records from primary care regarding most recent lipid panel      Will plan to follow-up with patient in approximately 5 weeks to discuss results of echocardiogram, and blood pressure monitoring.  Patient should feel free to reach out prior to next evaluation if her symptoms change or worsen.    Follow Up {Instructions  Charge Capture  Follow-up Communications :23}   Return in about 5 weeks (around 4/1/2025) for Video visit, Hypertension, Chest pain, Result review.    Patient was given instructions and counseling regarding her condition or for health maintenance advice. Please see specific information pulled into the AVS if appropriate.  Patient was instructed to call the Heart and Valve Center with any questions, concerns, or worsening symptoms.    *Please note that portions of this note were completed with a voice recognition program. Efforts were made to edit the dictations, but occasionally words are mistranscribed.

## 2025-02-25 ENCOUNTER — TELEMEDICINE (OUTPATIENT)
Dept: CARDIOLOGY | Facility: HOSPITAL | Age: 60
End: 2025-02-25
Payer: MEDICARE

## 2025-02-25 DIAGNOSIS — E78.49 OTHER HYPERLIPIDEMIA: ICD-10-CM

## 2025-02-25 DIAGNOSIS — R07.89 OTHER CHEST PAIN: Primary | ICD-10-CM

## 2025-02-25 DIAGNOSIS — I10 PRIMARY HYPERTENSION: ICD-10-CM

## 2025-02-25 RX ORDER — GLIPIZIDE 10 MG/1
10 TABLET ORAL
COMMUNITY

## 2025-03-10 ENCOUNTER — HOSPITAL ENCOUNTER (OUTPATIENT)
Facility: HOSPITAL | Age: 60
Discharge: HOME | End: 2025-03-10
Admitting: NURSE PRACTITIONER
Payer: MEDICARE

## 2025-03-10 DIAGNOSIS — R07.89 OTHER CHEST PAIN: ICD-10-CM

## 2025-03-10 PROCEDURE — 93306 TTE W/DOPPLER COMPLETE: CPT

## 2025-03-10 PROCEDURE — 93306 TTE W/DOPPLER COMPLETE: CPT | Performed by: INTERNAL MEDICINE

## 2025-03-11 LAB
ASCENDING AORTA: 3.6 CM
AV MEAN PRESS GRAD SYS DOP V1V2: 4 MMHG
AV VMAX SYS DOP: 138 CM/SEC
BH CV ECHO MEAS - AO MAX PG: 7.6 MMHG
BH CV ECHO MEAS - AO ROOT DIAM: 2.4 CM
BH CV ECHO MEAS - AO V2 VTI: 29.5 CM
BH CV ECHO MEAS - AVA(I,D): 2.2 CM2
BH CV ECHO MEAS - EDV(CUBED): 97.3 ML
BH CV ECHO MEAS - EDV(MOD-SP2): 49.4 ML
BH CV ECHO MEAS - EDV(MOD-SP4): 79.5 ML
BH CV ECHO MEAS - EF(MOD-SP2): 56.9 %
BH CV ECHO MEAS - EF(MOD-SP4): 64.8 %
BH CV ECHO MEAS - ESV(CUBED): 17.6 ML
BH CV ECHO MEAS - ESV(MOD-SP2): 21.3 ML
BH CV ECHO MEAS - ESV(MOD-SP4): 28 ML
BH CV ECHO MEAS - FS: 43.5 %
BH CV ECHO MEAS - IVS/LVPW: 1.09 CM
BH CV ECHO MEAS - IVSD: 1.2 CM
BH CV ECHO MEAS - LA DIMENSION: 3.9 CM
BH CV ECHO MEAS - LAT PEAK E' VEL: 8.3 CM/SEC
BH CV ECHO MEAS - LV DIASTOLIC VOL/BSA (35-75): 42 CM2
BH CV ECHO MEAS - LV MASS(C)D: 192.9 GRAMS
BH CV ECHO MEAS - LV MAX PG: 3.7 MMHG
BH CV ECHO MEAS - LV MEAN PG: 2 MMHG
BH CV ECHO MEAS - LV SYSTOLIC VOL/BSA (12-30): 14.8 CM2
BH CV ECHO MEAS - LV V1 MAX: 95.9 CM/SEC
BH CV ECHO MEAS - LV V1 VTI: 20.7 CM
BH CV ECHO MEAS - LVIDD: 4.6 CM
BH CV ECHO MEAS - LVIDS: 2.6 CM
BH CV ECHO MEAS - LVOT AREA: 3.1 CM2
BH CV ECHO MEAS - LVOT DIAM: 2 CM
BH CV ECHO MEAS - LVPWD: 1.1 CM
BH CV ECHO MEAS - MED PEAK E' VEL: 7.8 CM/SEC
BH CV ECHO MEAS - MV A MAX VEL: 97.8 CM/SEC
BH CV ECHO MEAS - MV DEC SLOPE: 639 CM/SEC2
BH CV ECHO MEAS - MV DEC TIME: 0.23 SEC
BH CV ECHO MEAS - MV E MAX VEL: 89.6 CM/SEC
BH CV ECHO MEAS - MV E/A: 0.92
BH CV ECHO MEAS - MV MAX PG: 6.8 MMHG
BH CV ECHO MEAS - MV MEAN PG: 3 MMHG
BH CV ECHO MEAS - MV P1/2T: 58.7 MSEC
BH CV ECHO MEAS - MV V2 VTI: 27.2 CM
BH CV ECHO MEAS - MVA(P1/2T): 3.7 CM2
BH CV ECHO MEAS - MVA(VTI): 2.39 CM2
BH CV ECHO MEAS - PA ACC TIME: 0.08 SEC
BH CV ECHO MEAS - RAP SYSTOLE: 3 MMHG
BH CV ECHO MEAS - SV(LVOT): 65 ML
BH CV ECHO MEAS - SV(MOD-SP2): 28.1 ML
BH CV ECHO MEAS - SV(MOD-SP4): 51.5 ML
BH CV ECHO MEAS - SVI(LVOT): 34.4 ML/M2
BH CV ECHO MEAS - SVI(MOD-SP2): 14.9 ML/M2
BH CV ECHO MEAS - SVI(MOD-SP4): 27.2 ML/M2
BH CV ECHO MEAS - TAPSE (>1.6): 2.9 CM
BH CV ECHO MEASUREMENTS AVERAGE E/E' RATIO: 11.13
BH CV XLRA - RV BASE: 3.4 CM
BH CV XLRA - RV LENGTH: 7.1 CM
BH CV XLRA - RV MID: 3.1 CM
BH CV XLRA - TDI S': 17.2 CM/SEC
LEFT ATRIUM VOLUME INDEX: 16.9 ML/M2
LV EF BIPLANE MOD: 59.3 %

## 2025-04-01 ENCOUNTER — TELEPHONE (OUTPATIENT)
Dept: CARDIOLOGY | Facility: HOSPITAL | Age: 60
End: 2025-04-01
Payer: MEDICARE

## 2025-04-01 NOTE — TELEPHONE ENCOUNTER
Caller: Nunu Falk    Relationship to patient: Self    Best call back number: [phone]     Type of visit: F/U APPT ( PT WOULD LIKE A TELE-HEALTH VISIT)    Requested date: NEXT AVAILABLE     If rescheduling, when is the original appointment: 4-2-25     Additional notes:PLEASE CONTACT PATIENT WITH A SUITABLE DATE.

## 2025-04-02 NOTE — TELEPHONE ENCOUNTER
Spoke with patient and got her set up for a video appt. I reminded the patient to take her blood pressure prior to appt.

## 2025-04-22 ENCOUNTER — HOSPITAL ENCOUNTER (OUTPATIENT)
Dept: CARDIOLOGY | Facility: HOSPITAL | Age: 60
Discharge: HOME | End: 2025-04-22
Payer: MEDICARE

## 2025-04-22 ENCOUNTER — TELEMEDICINE (OUTPATIENT)
Dept: CARDIOLOGY | Facility: HOSPITAL | Age: 60
End: 2025-04-22
Payer: MEDICARE

## 2025-04-22 VITALS — SYSTOLIC BLOOD PRESSURE: 125 MMHG | DIASTOLIC BLOOD PRESSURE: 77 MMHG | HEART RATE: 67 BPM

## 2025-04-22 DIAGNOSIS — I10 PRIMARY HYPERTENSION: ICD-10-CM

## 2025-04-22 DIAGNOSIS — R00.2 PALPITATIONS: ICD-10-CM

## 2025-04-22 DIAGNOSIS — E78.49 OTHER HYPERLIPIDEMIA: ICD-10-CM

## 2025-04-22 DIAGNOSIS — R07.89 OTHER CHEST PAIN: Primary | ICD-10-CM

## 2025-04-22 NOTE — PROGRESS NOTES
Mode of visit: Video  Location of patient:[facility]   Location of provider: [facility]  You have chosen to receive care through a telehealth visit.   Do you consent to the use video/audio connection for your medical care today?: Yes  This visit included audio and video interaction. No technical issues occurred during the visit.       Chief Complaint  Chest Pain    [facility]  Heart and Valve Center  Telemedicine note    This was a video enabled telemedicine encounter.    Subjective    History of Present Illness {CC  Problem List  Visit  Diagnosis   Encounters  Notes  Medications  Labs  Result Review Imaging  Media :23}     Nunu Falk, 59 y.o. female with past medical history significant for anxiety, depression, diabetes, and hypertension, who presents to [facility] Heart and Valve telemedicine visit for Chest Pain. Since time of evaluation, patient has undergone echocardiogram. She has been compliant with monitoring her blood pressure regularly which appears to be overall well-controlled.  At time of today's evaluation, patient denies ongoing exertional chest pain or pressure.  She also denies dyspnea on exertion, syncope, or lower extremity edema.  She endorses an episode of near syncope immediately after undergoing her echocardiogram.  Her blood pressure at that time was noted to be elevated.  Patient also endorses intermittent episodes of palpitations.  She describes this as a twisting/punching sensation located in the center of her chest.    Echocardiogram 3/10/2025:    Left ventricular systolic function is normal. Calculated left ventricular EF = 59.3% Left ventricular ejection fraction appears to be 56 - 60%.    Left ventricular wall thickness is consistent with borderline concentric hypertrophy. Left ventricular diastolic function was normal.    Normal right ventricular size and function.    Mild mitral valve regurgitation is present.    Borderline dilation of the  ascending aorta is present. Ascending aorta = 3.6 cm.    Initial presentation:  Patient presented to [facility] ED on 2/18/2025 with chief complaint of left upper back pain.  Twelve-lead EKG while in the ED showed normal sinus rhythm, rate 64, normal EKG.  CT scan of the chest without contrast showed no acute intrathoracic pathology.  There is mention of mild coronary artery calcifications.  High-sensitivity troponin x 2 noted to be unremarkable.  Patient was discharged from the ED with new prescription for naproxen 500 mg twice daily.  She was encouraged to follow-up with primary care, and heart and valve center.    At time of today's evaluation, patient denies additional episodes of chest pain or pressure.  During discussion of recent ED visit, patient denies ever having actual chest pain.  She states that her discomfort was only located in her back.  Patient currently denies any dyspnea on exertion, syncope, near syncope, lower extremity edema, or palpitations.  Patient does intermittently check her blood pressure at home which she states is overall well-controlled.    Activity level: Patient is active at work without routine exercise  Caffeine intake: 3-4 diet cokes (20 ounces bottles) daily  Water intake: Inadequate   Nicotine use: Vaping  Family history: Unknown patient is adopted      Objective     Vital Signs:   Vitals:    04/22/25 1000   BP: 125/77   Pulse: 67       There is no height or weight on file to calculate BMI.  Physical Exam  Vitals and nursing note reviewed.   Constitutional:       Appearance: Normal appearance.   HENT:      Head: Normocephalic.   Pulmonary:      Effort: Pulmonary effort is normal. No respiratory distress.   Musculoskeletal:         General: Normal range of motion.   Neurological:      Mental Status: She is alert and oriented to person, place, and time.   Psychiatric:         Mood and Affect: Mood normal.         Thought Content: Thought content normal.                 Data Reviewed:{ Labs  Result Review  Imaging  Med Tab  Media :23}     Lab Results   Component Value Date    WBC 8.89 02/18/2025    HGB 12.6 02/18/2025    HCT 37.3 02/18/2025    MCV 85.4 02/18/2025     02/18/2025      Lab Results   Component Value Date    GLUCOSE 89 02/18/2025    BUN 19 02/18/2025    CREATININE 1.04 (H) 02/18/2025     02/18/2025    K 3.8 02/18/2025     02/18/2025    CALCIUM 9.7 02/18/2025    PROTEINTOT 7.6 02/18/2025    ALBUMIN 4.3 02/18/2025    ALT 17 02/18/2025    AST 18 02/18/2025    ALKPHOS 49 02/18/2025    BILITOT 0.4 02/18/2025    GLOB 3.3 02/18/2025    AGRATIO 1.3 02/18/2025    BCR 18.3 02/18/2025    ANIONGAP 12.0 02/18/2025    EGFR 62.8 02/18/2025      Lab Results   Component Value Date    TROPONINI <0.01 12/31/2014    TROPONINT 12 02/18/2025        CT Chest Without Contrast Diagnostic (02/18/2025 13:09)  XR Chest 2 View (02/18/2025 09:57)    Assessment and Plan {CC Problem List  Visit Diagnosis  ROS  Review (Popup)  Health Maintenance  Quality  BestPractice  Medications  SmartSets  SnapShot Encounters  Media :23}     This visit has been scheduled as a video visit.     1. Other chest pain  -Patient currently denies ongoing episodes of chest pain or pressure since prior evaluation  - We have reviewed and discussed most recent cardiac testing including echocardiogram  -Echocardiogram 3/10/2025:    Left ventricular systolic function is normal. Calculated left ventricular EF = 59.3% Left ventricular ejection fraction appears to be 56 - 60%.    Left ventricular wall thickness is consistent with borderline concentric hypertrophy. Left ventricular diastolic function was normal.    Normal right ventricular size and function.    Mild mitral valve regurgitation is present.    Borderline dilation of the ascending aorta is present. Ascending aorta = 3.6 cm.  - We have also revisited prior CT scan of the chest with mention of mild coronary calcifications.   Patient is extremely reluctant to undergo stress testing as she previously had undergone a chemical stress test and had significant side effects from medications.  Discussed with patient that due to lack of exertional symptoms, we will continue to defer stress testing at this time.  -Offered patient to establish care with cardiology physician for long-term monitoring and management of mild coronary calcifications, hypertension, and hyperlipidemia.  Patient would like to undergo Holter monitor prior to making a decision regarding cardiology physician    2. Primary hypertension  -Blood pressure appears to be adequately controlled according to ambulatory blood pressure readings  -Discussed with patient recommendation to continue daily ambulatory blood pressure monitoring.  Ideally, patient would check her blood pressure once per day 2 to 3 hours after taking morning medication, and after sitting and resting for 10 to 15 minutes  -Patient will document date, time, blood pressure, and pulse rate.  Patient is aware that her goal blood pressure is consistently less than 140/90  -For now, would recommend patient continue current regimen of lisinopril 5 mg daily    3.  Hyperlipidemia  -Lipid panel currently managed by primary care  -Recommend to continue atorvastatin 20 mg daily    4.  Palpitations  - Patient endorses feeling abnormal heartbeats which she describes as a rolling/twisting/punching sensation in her chest intermittently  - Suspect that patient could be having PACs versus PVCs.  Patient's most recent twelve-lead EKG noted normal sinus rhythm without ectopy  - Will plan for patient undergo 14-day cardiac monitor to assess for burden of PACs/PVCs, as well as any arrhythmias which could be contributing to patient's symptoms      Will plan to follow-up with patient in approximately 5 weeks via telemedicine to discuss results of Holter monitor.  Patient will make a decision regarding cardiology physician at that time.   She was encouraged strongly to reach out prior to next evaluation if her symptoms change or worsen.    Follow Up {Instructions Charge Capture  Follow-up Communications :23}   Return in about 5 weeks (around 5/27/2025) for Result review, Palpitations, Chest pain, Video visit.    Patient was given instructions and counseling regarding her condition or for health maintenance advice. Please see specific information pulled into the AVS if appropriate.  Patient was instructed to call the Heart and Valve Center with any questions, concerns, or worsening symptoms.    *Please note that portions of this note were completed with a voice recognition program. Efforts were made to edit the dictations, but occasionally words are mistranscribed.

## 2025-04-22 NOTE — PROGRESS NOTES
[facility] Heart Monitor Documentation    Nunu Falk  1965  [id number]  04/22/25      [] ZIO XT Patch  Model [id number] Prescribed for  Days    Serial Number: (N + 9 Digits) N   Apply-By Date on Box:   [employer]PS Tracking Number:   USPS Tracking        [] [employer] MINI PLUS Mobile Cardiac Telemetry  Model BGMINIPLUS Prescribed for  Days    Serial Number: (BGM + 7 Digits) BGM  Shipped-By Date on Box:   UPS Tracking Number: [id number]  UPS Tracking      [x] [employer] MINI Holter Monitor  Model BGMINIEL Prescribed for 14 Days    Serial Number: (7 Digits)   Shipped-By Date on Box:   UPS Tracking Number: [id number]  UPS Tracking        Monitor will be mailed to patient.    Alcira Vera MA, 11:17 EDT, 04/22/25                  [facility]

## 2025-05-21 ENCOUNTER — RESULTS FOLLOW-UP (OUTPATIENT)
Dept: CARDIOLOGY | Facility: HOSPITAL | Age: 60
End: 2025-05-21
Payer: MEDICARE

## 2025-05-21 NOTE — PROGRESS NOTES
We have received the final results of your recent cardiac monitor.  Your average heart rate was 66 which is acceptable.  You did not have any sustained abnormal heart rhythms during this time.  We will discuss these results including your patient triggered events and much further detail at the time of our upcoming office visit.  Look forward to seeing you then.

## 2025-05-27 ENCOUNTER — TELEMEDICINE (OUTPATIENT)
Dept: CARDIOLOGY | Facility: HOSPITAL | Age: 60
End: 2025-05-27
Payer: MEDICARE

## 2025-05-27 VITALS — HEART RATE: 67 BPM | SYSTOLIC BLOOD PRESSURE: 122 MMHG | DIASTOLIC BLOOD PRESSURE: 74 MMHG

## 2025-05-27 DIAGNOSIS — R00.2 PALPITATIONS: Primary | ICD-10-CM

## 2025-05-27 DIAGNOSIS — E78.49 OTHER HYPERLIPIDEMIA: ICD-10-CM

## 2025-05-27 DIAGNOSIS — R07.89 OTHER CHEST PAIN: ICD-10-CM

## 2025-05-27 DIAGNOSIS — I10 PRIMARY HYPERTENSION: ICD-10-CM

## 2025-05-27 RX ORDER — ATENOLOL AND CHLORTHALIDONE TABLET 50; 25 MG/1; MG/1
1 TABLET ORAL DAILY
COMMUNITY

## 2025-05-27 NOTE — PROGRESS NOTES
Mode of visit: Video  Location of patient:[facility]   Location of provider: [facility]  You have chosen to receive care through a telehealth visit.   Do you consent to the use video/audio connection for your medical care today?: Yes  This visit included audio and video interaction. No technical issues occurred during the visit.       Chief Complaint  Palpitations    [facility]  [facility]  Telemedicine note    This was a video enabled telemedicine encounter.    Subjective    History of Present Illness {CC  Problem List  Visit  Diagnosis   Encounters  Notes  Medications  Labs  Result Review Imaging  Media :23}     Nunu Falk, 59 y.o. female with past medical history significant for anxiety, depression, diabetes, and hypertension, who presents to [facility] telemedicine visit for Palpitations.  Time of today's evaluation, patient denies any additional episodes of chest pain or pressure.  She also denies any significant dyspnea on exertion, syncope, near syncope, lower extremity edema, or palpitations.  Patient does endorse an isolated episode of feeling lightheaded after going from sitting to standing 1 evening.  Patient has not been routinely monitoring her blood pressure while at home.    Holter monitor 4/22/2025:    1.  Sinus rhythm.  max heart rate 142, min 41, avg. 66.    2.  NSVT longest 4 beats.    3.  Nominal ectopy.  Less than 1% PVC.    4. 18 patient triggered events.  10/18 associated with pvcs.    Echocardiogram 3/10/2025:    Left ventricular systolic function is normal. Calculated left ventricular EF = 59.3% Left ventricular ejection fraction appears to be 56 - 60%.    Left ventricular wall thickness is consistent with borderline concentric hypertrophy. Left ventricular diastolic function was normal.    Normal right ventricular size and function.    Mild mitral valve regurgitation is present.    Borderline dilation of the ascending  aorta is present. Ascending aorta = 3.6 cm.    Initial presentation:  Patient presented to [facility] ED on 2/18/2025 with chief complaint of left upper back pain.  Twelve-lead EKG while in the ED showed normal sinus rhythm, rate 64, normal EKG.  CT scan of the chest without contrast showed no acute intrathoracic pathology.  There is mention of mild coronary artery calcifications.  High-sensitivity troponin x 2 noted to be unremarkable.  Patient was discharged from the ED with new prescription for naproxen 500 mg twice daily.  She was encouraged to follow-up with primary care, and heart and valve center.    At time of today's evaluation, patient denies additional episodes of chest pain or pressure.  During discussion of recent ED visit, patient denies ever having actual chest pain.  She states that her discomfort was only located in her back.  Patient currently denies any dyspnea on exertion, syncope, near syncope, lower extremity edema, or palpitations.  Patient does intermittently check her blood pressure at home which she states is overall well-controlled.    Activity level: Patient is active at work without routine exercise  Caffeine intake: 3-4 diet cokes (20 ounces bottles) daily  Water intake: Inadequate   Nicotine use: Vaping  Family history: Unknown patient is adopted      Objective     Vital Signs:   Vitals:    05/27/25 1301   BP: 122/74   Pulse: 67         There is no height or weight on file to calculate BMI.  Physical Exam  Vitals and nursing note reviewed.   Constitutional:       Appearance: Normal appearance.   HENT:      Head: Normocephalic.   Pulmonary:      Effort: Pulmonary effort is normal. No respiratory distress.   Musculoskeletal:         General: Normal range of motion.   Neurological:      Mental Status: She is alert and oriented to person, place, and time.   Psychiatric:         Mood and Affect: Mood normal.         Thought Content: Thought content normal.                Data  Reviewed:{ Labs  Result Review  Imaging  Med Tab  Media :23}     Lab Results   Component Value Date    WBC 8.89 02/18/2025    HGB 12.6 02/18/2025    HCT 37.3 02/18/2025    MCV 85.4 02/18/2025     02/18/2025      Lab Results   Component Value Date    GLUCOSE 89 02/18/2025    BUN 19 02/18/2025    CREATININE 1.04 (H) 02/18/2025     02/18/2025    K 3.8 02/18/2025     02/18/2025    CALCIUM 9.7 02/18/2025    PROTEINTOT 7.6 02/18/2025    ALBUMIN 4.3 02/18/2025    ALT 17 02/18/2025    AST 18 02/18/2025    ALKPHOS 49 02/18/2025    BILITOT 0.4 02/18/2025    GLOB 3.3 02/18/2025    AGRATIO 1.3 02/18/2025    BCR 18.3 02/18/2025    ANIONGAP 12.0 02/18/2025    EGFR 62.8 02/18/2025      Lab Results   Component Value Date    TROPONINI <0.01 12/31/2014    TROPONINT 12 02/18/2025        CT Chest Without Contrast Diagnostic (02/18/2025 13:09)  XR Chest 2 View (02/18/2025 09:57)    Assessment and Plan {CC Problem List  Visit Diagnosis  ROS  Review (Popup)  Health Maintenance  Quality  BestPractice  Medications  SmartSets  SnapShot Encounters  Media :23}     This visit has been scheduled as a video visit.     1. Other chest pain  -Patient currently denies ongoing episodes of chest pain or pressure since prior evaluation  -Echocardiogram 3/10/2025:    Left ventricular systolic function is normal. Calculated left ventricular EF = 59.3% Left ventricular ejection fraction appears to be 56 - 60%.    Left ventricular wall thickness is consistent with borderline concentric hypertrophy. Left ventricular diastolic function was normal.    Normal right ventricular size and function.    Mild mitral valve regurgitation is present.    Borderline dilation of the ascending aorta is present. Ascending aorta = 3.6 cm.  - We have also revisited prior CT scan of the chest with mention of mild coronary calcifications.  Patient is extremely reluctant to undergo stress testing as she previously had undergone a chemical stress  test and had significant side effects from medications.  Discussed with patient that due to lack of exertional symptoms, we will continue to defer stress testing at this time.  -Offered patient again to establish care with cardiology physician for long-term monitoring and management of mild coronary calcifications, hypertension, and hyperlipidemia.  Patient would like to defer establishing care with cardiology physician at this time.  She will notify heart and valve if she changes her mind.    2. Primary hypertension  -Blood pressure appears to be adequately controlled according to ambulatory blood pressure readings  -Discussed with patient recommendation to continue daily ambulatory blood pressure monitoring.  Ideally, patient would check her blood pressure once per day 2 to 3 hours after taking morning medication, and after sitting and resting for 10 to 15 minutes  -Patient will document date, time, blood pressure, and pulse rate.  Patient is aware that her goal blood pressure is consistently less than 140/90  -For now, would recommend patient continue current regimen of lisinopril 5 mg daily, and atenolol-chlorthalidone at 50-25 mg daily    3.  Hyperlipidemia  -Lipid panel currently managed by primary care  -Recommend to continue atorvastatin 20 mg daily    4.  Palpitations  - Patient denies significant episodes of palpitations recently  - We have reviewed and discussed most recent cardiac monitor.  Discussed with patient that while her ectopy is rare, she was symptomatic during episodes of PVCs, and occasional PACs.  Discussed with patient that having an abnormal potassium level is a common cause of PVCs.  If she notices increasing frequency of her PVCs, we recommend she have her metabolic panel checked with primary care  - Patient is already maintained on atenolol 50 mg daily.  Recommend to continue.  - Also of note, patient does have known MUSTAPHA from a prior sleep study.  She has been unable to tolerate any CPAP  mask.      At this time, patient may be seen by heart and valve as needed, or if symptoms change or worsen.  It would be reasonable for patient to establish care with cardiology physician if she is willing.  Otherwise, we recommend close ongoing follow-up with primary care      Follow Up {Instructions Charge Capture  Follow-up Communications :23}   Return if symptoms worsen or fail to improve.    Patient was given instructions and counseling regarding her condition or for health maintenance advice. Please see specific information pulled into the AVS if appropriate.  Patient was instructed to call the [facility] with any questions, concerns, or worsening symptoms.    *Please note that portions of this note were completed with a voice recognition program. Efforts were made to edit the dictations, but occasionally words are mistranscribed.